# Patient Record
Sex: FEMALE | Race: OTHER | HISPANIC OR LATINO | URBAN - METROPOLITAN AREA
[De-identification: names, ages, dates, MRNs, and addresses within clinical notes are randomized per-mention and may not be internally consistent; named-entity substitution may affect disease eponyms.]

---

## 2019-05-21 ENCOUNTER — EMERGENCY (EMERGENCY)
Facility: HOSPITAL | Age: 54
LOS: 1 days | Discharge: HOME | End: 2019-05-21
Attending: EMERGENCY MEDICINE
Payer: COMMERCIAL

## 2019-05-21 PROCEDURE — 99285 EMERGENCY DEPT VISIT HI MDM: CPT | Mod: 25

## 2019-05-22 VITALS
HEART RATE: 72 BPM | WEIGHT: 179.9 LBS | RESPIRATION RATE: 16 BRPM | DIASTOLIC BLOOD PRESSURE: 87 MMHG | SYSTOLIC BLOOD PRESSURE: 147 MMHG | OXYGEN SATURATION: 99 % | TEMPERATURE: 98 F

## 2019-05-22 VITALS
SYSTOLIC BLOOD PRESSURE: 119 MMHG | OXYGEN SATURATION: 99 % | DIASTOLIC BLOOD PRESSURE: 66 MMHG | RESPIRATION RATE: 16 BRPM | HEART RATE: 63 BPM | TEMPERATURE: 98 F

## 2019-05-22 DIAGNOSIS — Z98.890 OTHER SPECIFIED POSTPROCEDURAL STATES: Chronic | ICD-10-CM

## 2019-05-22 LAB
ALBUMIN SERPL ELPH-MCNC: 3.5 G/DL — SIGNIFICANT CHANGE UP (ref 3.5–5)
ALP SERPL-CCNC: 62 U/L — SIGNIFICANT CHANGE UP (ref 40–120)
ALT FLD-CCNC: 25 U/L DA — SIGNIFICANT CHANGE UP (ref 10–60)
ANION GAP SERPL CALC-SCNC: 9 MMOL/L — SIGNIFICANT CHANGE UP (ref 5–17)
APTT BLD: 29.1 SEC — SIGNIFICANT CHANGE UP (ref 27.5–36.3)
AST SERPL-CCNC: 15 U/L — SIGNIFICANT CHANGE UP (ref 10–40)
BILIRUB SERPL-MCNC: 0.3 MG/DL — SIGNIFICANT CHANGE UP (ref 0.2–1.2)
BUN SERPL-MCNC: 14 MG/DL — SIGNIFICANT CHANGE UP (ref 7–18)
CALCIUM SERPL-MCNC: 8.7 MG/DL — SIGNIFICANT CHANGE UP (ref 8.4–10.5)
CHLORIDE SERPL-SCNC: 108 MMOL/L — SIGNIFICANT CHANGE UP (ref 96–108)
CO2 SERPL-SCNC: 25 MMOL/L — SIGNIFICANT CHANGE UP (ref 22–31)
CREAT SERPL-MCNC: 0.76 MG/DL — SIGNIFICANT CHANGE UP (ref 0.5–1.3)
D DIMER BLD IA.RAPID-MCNC: 218 NG/ML DDU — SIGNIFICANT CHANGE UP
GLUCOSE SERPL-MCNC: 104 MG/DL — HIGH (ref 70–99)
HCG SERPL-ACNC: <1 MIU/ML — SIGNIFICANT CHANGE UP
HCT VFR BLD CALC: 36.6 % — SIGNIFICANT CHANGE UP (ref 34.5–45)
HGB BLD-MCNC: 11.9 G/DL — SIGNIFICANT CHANGE UP (ref 11.5–15.5)
INR BLD: 0.93 RATIO — SIGNIFICANT CHANGE UP (ref 0.88–1.16)
MCHC RBC-ENTMCNC: 26 PG — LOW (ref 27–34)
MCHC RBC-ENTMCNC: 32.5 GM/DL — SIGNIFICANT CHANGE UP (ref 32–36)
MCV RBC AUTO: 80.1 FL — SIGNIFICANT CHANGE UP (ref 80–100)
NRBC # BLD: 0 /100 WBCS — SIGNIFICANT CHANGE UP (ref 0–0)
NT-PROBNP SERPL-SCNC: 37 PG/ML — SIGNIFICANT CHANGE UP (ref 0–125)
PLATELET # BLD AUTO: 306 K/UL — SIGNIFICANT CHANGE UP (ref 150–400)
POTASSIUM SERPL-MCNC: 3.8 MMOL/L — SIGNIFICANT CHANGE UP (ref 3.5–5.3)
POTASSIUM SERPL-SCNC: 3.8 MMOL/L — SIGNIFICANT CHANGE UP (ref 3.5–5.3)
PROT SERPL-MCNC: 7.2 G/DL — SIGNIFICANT CHANGE UP (ref 6–8.3)
PROTHROM AB SERPL-ACNC: 10.3 SEC — SIGNIFICANT CHANGE UP (ref 10–12.9)
RBC # BLD: 4.57 M/UL — SIGNIFICANT CHANGE UP (ref 3.8–5.2)
RBC # FLD: 13.9 % — SIGNIFICANT CHANGE UP (ref 10.3–14.5)
SODIUM SERPL-SCNC: 142 MMOL/L — SIGNIFICANT CHANGE UP (ref 135–145)
TROPONIN I SERPL-MCNC: <0.015 NG/ML — SIGNIFICANT CHANGE UP (ref 0–0.04)
WBC # BLD: 8.81 K/UL — SIGNIFICANT CHANGE UP (ref 3.8–10.5)
WBC # FLD AUTO: 8.81 K/UL — SIGNIFICANT CHANGE UP (ref 3.8–10.5)

## 2019-05-22 PROCEDURE — 84702 CHORIONIC GONADOTROPIN TEST: CPT

## 2019-05-22 PROCEDURE — 71275 CT ANGIOGRAPHY CHEST: CPT

## 2019-05-22 PROCEDURE — 71046 X-RAY EXAM CHEST 2 VIEWS: CPT

## 2019-05-22 PROCEDURE — 83880 ASSAY OF NATRIURETIC PEPTIDE: CPT

## 2019-05-22 PROCEDURE — 99284 EMERGENCY DEPT VISIT MOD MDM: CPT | Mod: 25

## 2019-05-22 PROCEDURE — 36415 COLL VENOUS BLD VENIPUNCTURE: CPT

## 2019-05-22 PROCEDURE — 85027 COMPLETE CBC AUTOMATED: CPT

## 2019-05-22 PROCEDURE — 71275 CT ANGIOGRAPHY CHEST: CPT | Mod: 26

## 2019-05-22 PROCEDURE — 85379 FIBRIN DEGRADATION QUANT: CPT

## 2019-05-22 PROCEDURE — 85610 PROTHROMBIN TIME: CPT

## 2019-05-22 PROCEDURE — 93005 ELECTROCARDIOGRAM TRACING: CPT

## 2019-05-22 PROCEDURE — 71046 X-RAY EXAM CHEST 2 VIEWS: CPT | Mod: 26

## 2019-05-22 PROCEDURE — 84484 ASSAY OF TROPONIN QUANT: CPT

## 2019-05-22 PROCEDURE — 85730 THROMBOPLASTIN TIME PARTIAL: CPT

## 2019-05-22 PROCEDURE — 80053 COMPREHEN METABOLIC PANEL: CPT

## 2019-05-22 NOTE — ED PROVIDER NOTE - CLINICAL SUMMARY MEDICAL DECISION MAKING FREE TEXT BOX
55 y/o F with a PMHx of breast CA presents to the ED with sudden onset chest pain and SOB that has since resolved x 2300 last night. Will check EKG, labs, CTA to r/o PE, and reassess 55 y/o F with a PMHx of breast CA presents to the ED with sudden onset chest pain and SOB that has since resolved x 2300 last night. Will check EKG, labs, CTA to r/o PE, and reassess    labs shows neg trop, ddimer wnl, CTA neg for PE  discussed above with patient, on reeval patient denies recurrence of chest pain/dyspnea while in ED. Pt stable for discharge.

## 2019-05-22 NOTE — ED PROVIDER NOTE - OBJECTIVE STATEMENT
53 y/o F with a significant PMHx of breast CA, on PO chemotherapy after lumpectomy, and no significant PSHX presents to the ED with c/o sudden onset chest pain and SOB x 2300 last night that has since resolved. Pt states she woke up with L-sided chest pain. Pt notes that after drinking lemon water the sxs resolved. Pt reports she has chronic LLE swelling and had a Doppler US earlier yesterday, but has not received the results. Pt endorses the swelling began in the fall of 2018 s/p starting Tamoxifen. Pt denies fever, cough, or any other complaints. NKDA.

## 2020-12-15 ENCOUNTER — INPATIENT (INPATIENT)
Facility: HOSPITAL | Age: 55
LOS: 4 days | Discharge: HOME | DRG: 177 | End: 2020-12-20
Attending: INTERNAL MEDICINE | Admitting: INTERNAL MEDICINE
Payer: COMMERCIAL

## 2020-12-15 VITALS
RESPIRATION RATE: 22 BRPM | SYSTOLIC BLOOD PRESSURE: 133 MMHG | HEIGHT: 66.93 IN | HEART RATE: 87 BPM | OXYGEN SATURATION: 92 % | WEIGHT: 176.37 LBS | TEMPERATURE: 99 F | DIASTOLIC BLOOD PRESSURE: 82 MMHG

## 2020-12-15 DIAGNOSIS — Z90.11 ACQUIRED ABSENCE OF RIGHT BREAST AND NIPPLE: Chronic | ICD-10-CM

## 2020-12-15 DIAGNOSIS — C50.919 MALIGNANT NEOPLASM OF UNSPECIFIED SITE OF UNSPECIFIED FEMALE BREAST: ICD-10-CM

## 2020-12-15 DIAGNOSIS — Z90.49 ACQUIRED ABSENCE OF OTHER SPECIFIED PARTS OF DIGESTIVE TRACT: Chronic | ICD-10-CM

## 2020-12-15 DIAGNOSIS — U07.1 COVID-19: ICD-10-CM

## 2020-12-15 DIAGNOSIS — Z29.9 ENCOUNTER FOR PROPHYLACTIC MEASURES, UNSPECIFIED: ICD-10-CM

## 2020-12-15 DIAGNOSIS — Z98.890 OTHER SPECIFIED POSTPROCEDURAL STATES: Chronic | ICD-10-CM

## 2020-12-15 LAB
ALBUMIN SERPL ELPH-MCNC: 3 G/DL — LOW (ref 3.5–5)
ALP SERPL-CCNC: 98 U/L — SIGNIFICANT CHANGE UP (ref 40–120)
ALT FLD-CCNC: 68 U/L DA — HIGH (ref 10–60)
ANION GAP SERPL CALC-SCNC: 7 MMOL/L — SIGNIFICANT CHANGE UP (ref 5–17)
AST SERPL-CCNC: 55 U/L — HIGH (ref 10–40)
BASOPHILS # BLD AUTO: 0 K/UL — SIGNIFICANT CHANGE UP (ref 0–0.2)
BASOPHILS NFR BLD AUTO: 0 % — SIGNIFICANT CHANGE UP (ref 0–2)
BILIRUB SERPL-MCNC: 0.3 MG/DL — SIGNIFICANT CHANGE UP (ref 0.2–1.2)
BUN SERPL-MCNC: 7 MG/DL — SIGNIFICANT CHANGE UP (ref 7–18)
CALCIUM SERPL-MCNC: 8.6 MG/DL — SIGNIFICANT CHANGE UP (ref 8.4–10.5)
CHLORIDE SERPL-SCNC: 100 MMOL/L — SIGNIFICANT CHANGE UP (ref 96–108)
CO2 SERPL-SCNC: 28 MMOL/L — SIGNIFICANT CHANGE UP (ref 22–31)
CREAT SERPL-MCNC: 0.57 MG/DL — SIGNIFICANT CHANGE UP (ref 0.5–1.3)
CRP SERPL-MCNC: 4.51 MG/DL — HIGH (ref 0–0.4)
D DIMER BLD IA.RAPID-MCNC: 211 NG/ML DDU — SIGNIFICANT CHANGE UP
EOSINOPHIL # BLD AUTO: 0 K/UL — SIGNIFICANT CHANGE UP (ref 0–0.5)
EOSINOPHIL NFR BLD AUTO: 0 % — SIGNIFICANT CHANGE UP (ref 0–6)
FERRITIN SERPL-MCNC: 687 NG/ML — HIGH (ref 15–150)
GLUCOSE SERPL-MCNC: 95 MG/DL — SIGNIFICANT CHANGE UP (ref 70–99)
HCG SERPL-ACNC: <1 MIU/ML — SIGNIFICANT CHANGE UP
HCG UR QL: NEGATIVE — SIGNIFICANT CHANGE UP
HCT VFR BLD CALC: 37.3 % — SIGNIFICANT CHANGE UP (ref 34.5–45)
HGB BLD-MCNC: 11.8 G/DL — SIGNIFICANT CHANGE UP (ref 11.5–15.5)
LYMPHOCYTES # BLD AUTO: 0.77 K/UL — LOW (ref 1–3.3)
LYMPHOCYTES # BLD AUTO: 15 % — SIGNIFICANT CHANGE UP (ref 13–44)
MANUAL SMEAR VERIFICATION: SIGNIFICANT CHANGE UP
MCHC RBC-ENTMCNC: 25.3 PG — LOW (ref 27–34)
MCHC RBC-ENTMCNC: 31.6 GM/DL — LOW (ref 32–36)
MCV RBC AUTO: 80 FL — SIGNIFICANT CHANGE UP (ref 80–100)
MONOCYTES # BLD AUTO: 0.15 K/UL — SIGNIFICANT CHANGE UP (ref 0–0.9)
MONOCYTES NFR BLD AUTO: 3 % — SIGNIFICANT CHANGE UP (ref 2–14)
NEUTROPHILS # BLD AUTO: 4.12 K/UL — SIGNIFICANT CHANGE UP (ref 1.8–7.4)
NEUTROPHILS NFR BLD AUTO: 80 % — HIGH (ref 43–77)
NRBC # BLD: 0 /100 — SIGNIFICANT CHANGE UP (ref 0–0)
PLAT MORPH BLD: NORMAL — SIGNIFICANT CHANGE UP
PLATELET # BLD AUTO: 211 K/UL — SIGNIFICANT CHANGE UP (ref 150–400)
PLATELET COUNT - ESTIMATE: NORMAL — SIGNIFICANT CHANGE UP
POTASSIUM SERPL-MCNC: 4 MMOL/L — SIGNIFICANT CHANGE UP (ref 3.5–5.3)
POTASSIUM SERPL-SCNC: 4 MMOL/L — SIGNIFICANT CHANGE UP (ref 3.5–5.3)
PROCALCITONIN SERPL-MCNC: 0.07 NG/ML — SIGNIFICANT CHANGE UP (ref 0.02–0.1)
PROT SERPL-MCNC: 7.9 G/DL — SIGNIFICANT CHANGE UP (ref 6–8.3)
RAPID RVP RESULT: DETECTED
RBC # BLD: 4.66 M/UL — SIGNIFICANT CHANGE UP (ref 3.8–5.2)
RBC # FLD: 13.4 % — SIGNIFICANT CHANGE UP (ref 10.3–14.5)
RBC BLD AUTO: NORMAL — SIGNIFICANT CHANGE UP
SARS-COV-2 RNA SPEC QL NAA+PROBE: DETECTED
SODIUM SERPL-SCNC: 135 MMOL/L — SIGNIFICANT CHANGE UP (ref 135–145)
TROPONIN I SERPL-MCNC: <0.015 NG/ML — SIGNIFICANT CHANGE UP (ref 0–0.04)
VARIANT LYMPHS # BLD: 2 % — SIGNIFICANT CHANGE UP (ref 0–6)
WBC # BLD: 5.15 K/UL — SIGNIFICANT CHANGE UP (ref 3.8–10.5)
WBC # FLD AUTO: 5.15 K/UL — SIGNIFICANT CHANGE UP (ref 3.8–10.5)

## 2020-12-15 PROCEDURE — 71045 X-RAY EXAM CHEST 1 VIEW: CPT | Mod: 26

## 2020-12-15 PROCEDURE — 99285 EMERGENCY DEPT VISIT HI MDM: CPT

## 2020-12-15 RX ORDER — ASCORBIC ACID 60 MG
500 TABLET,CHEWABLE ORAL DAILY
Refills: 0 | Status: DISCONTINUED | OUTPATIENT
Start: 2020-12-15 | End: 2020-12-20

## 2020-12-15 RX ORDER — REMDESIVIR 5 MG/ML
INJECTION INTRAVENOUS
Refills: 0 | Status: COMPLETED | OUTPATIENT
Start: 2020-12-15 | End: 2020-12-19

## 2020-12-15 RX ORDER — DEXAMETHASONE 0.5 MG/5ML
6 ELIXIR ORAL ONCE
Refills: 0 | Status: COMPLETED | OUTPATIENT
Start: 2020-12-15 | End: 2020-12-15

## 2020-12-15 RX ORDER — ENOXAPARIN SODIUM 100 MG/ML
40 INJECTION SUBCUTANEOUS DAILY
Refills: 0 | Status: DISCONTINUED | OUTPATIENT
Start: 2020-12-15 | End: 2020-12-20

## 2020-12-15 RX ORDER — CHOLECALCIFEROL (VITAMIN D3) 125 MCG
2000 CAPSULE ORAL DAILY
Refills: 0 | Status: DISCONTINUED | OUTPATIENT
Start: 2020-12-15 | End: 2020-12-20

## 2020-12-15 RX ORDER — REMDESIVIR 5 MG/ML
100 INJECTION INTRAVENOUS EVERY 24 HOURS
Refills: 0 | Status: COMPLETED | OUTPATIENT
Start: 2020-12-16 | End: 2020-12-19

## 2020-12-15 RX ORDER — ZINC SULFATE TAB 220 MG (50 MG ZINC EQUIVALENT) 220 (50 ZN) MG
220 TAB ORAL DAILY
Refills: 0 | Status: DISCONTINUED | OUTPATIENT
Start: 2020-12-15 | End: 2020-12-20

## 2020-12-15 RX ORDER — DEXAMETHASONE 0.5 MG/5ML
6 ELIXIR ORAL DAILY
Refills: 0 | Status: DISCONTINUED | OUTPATIENT
Start: 2020-12-16 | End: 2020-12-20

## 2020-12-15 RX ORDER — ALBUTEROL 90 UG/1
4 AEROSOL, METERED ORAL ONCE
Refills: 0 | Status: COMPLETED | OUTPATIENT
Start: 2020-12-15 | End: 2020-12-15

## 2020-12-15 RX ORDER — REMDESIVIR 5 MG/ML
200 INJECTION INTRAVENOUS EVERY 24 HOURS
Refills: 0 | Status: COMPLETED | OUTPATIENT
Start: 2020-12-15 | End: 2020-12-15

## 2020-12-15 RX ADMIN — ALBUTEROL 4 PUFF(S): 90 AEROSOL, METERED ORAL at 13:24

## 2020-12-15 RX ADMIN — Medication 100 MILLIGRAM(S): at 23:30

## 2020-12-15 RX ADMIN — REMDESIVIR 200 MILLIGRAM(S): 5 INJECTION INTRAVENOUS at 20:32

## 2020-12-15 RX ADMIN — Medication 6 MILLIGRAM(S): at 13:24

## 2020-12-15 RX ADMIN — Medication 100 MILLIGRAM(S): at 17:49

## 2020-12-15 NOTE — ED PROVIDER NOTE - CARE PLAN
Principal Discharge DX:	COVID-19 virus infection  Secondary Diagnosis:	Hypoxemia requiring supplemental oxygen

## 2020-12-15 NOTE — H&P ADULT - NSHPPHYSICALEXAM_GEN_ALL_CORE
PHYSICAL EXAM:  GENERAL: NAD, on 4L NC  HEAD:  Atraumatic, Normocephalic  EYES: EOMI, PERRLA, conjunctiva and sclera clear  ENT: Moist mucous membranes  NECK: Supple, No JVD  CHEST/LUNG: Clear to auscultation bilaterally; No rales, rhonchi, wheezing, or rubs. cough occasionally   HEART: Regular rate and rhythm; No murmurs, rubs, or gallops  ABDOMEN: Bowel sounds present; Soft, Nontender, Nondistended.   EXTREMITIES:  2+ Peripheral Pulses, brisk capillary refill. No clubbing, cyanosis, or edema  NERVOUS SYSTEM:  Alert & Oriented X3, speech clear. No deficits   MSK: FROM all 4 extremities, full and equal strength  SKIN: No rashes or lesions

## 2020-12-15 NOTE — ED PROVIDER NOTE - CLINICAL SUMMARY MEDICAL DECISION MAKING FREE TEXT BOX
55F with chest tightness and shortness of breath. Pt O2SAT 92% at triage. Give ventilate inhaler, perform labs, CXR, ddimer and reassess.

## 2020-12-15 NOTE — H&P ADULT - PROBLEM SELECTOR PLAN 2
pt has h/o cough, fevers, myalgias and now SOB  low grade fever in ED  CXR shows bilateral patchy opacities  COVID + outpatient   s/p decadron in ED  c/w dexamethasone 6mg x 9 days  consider remdesevir  will continue with vitamin D, zinc, vitamin C  prn robitussin for cough   f/u COVID markers ESR, CRP, LDH, D dimers, troponin pt has h/o cough, fevers, myalgias and now SOB  low grade fever in ED  CXR shows bilateral patchy opacities  COVID + outpatient   s/p decadron in ED  c/w dexamethasone 6mg x 9 days  start on remdesevir after ID approval  will continue with vitamin D, zinc, vitamin C  prn robitussin for cough   f/u COVID markers ESR, CRP, LDH, D dimers, troponin

## 2020-12-15 NOTE — H&P ADULT - NSICDXPASTSURGICALHX_GEN_ALL_CORE_FT
PAST SURGICAL HISTORY:  H/O right mastectomy     History of colon resection     S/P appendectomy     S/P cholecystectomy

## 2020-12-15 NOTE — ED ADULT NURSE NOTE - OBJECTIVE STATEMENT
C/o chest pain with sob and cough.  Positive Covid recently.  Oxygen saturation improved with supplemental oxygen.  placed on cardiac monitor, ekg done, placed in isolation room.

## 2020-12-15 NOTE — H&P ADULT - HISTORY OF PRESENT ILLNESS
Patient, a 55 year old female from home, with PMH of right sided lobular CA in situ breast s/p mastectomy presents to the ED c/o shortness of breath today. Patient reports fever, cough and myalgias x 1 week, new onset of shortness of breath and chest tightness x today. Pt notes last fever x 2 days since resolved. Pt also notes dyspnea on exertion and decreased appetite. Pt relays sick contact () who is COVID positive, pt tested COVID positive x 10 days. Pt denies chills, leg pain/swelling, or any other complaints. No Hx blood clots or pulmonary disease.  Patient, a 55 year old female from home, with PMH of right sided lobular CA in situ breast s/p mastectomy presents to the ED c/o shortness of breath today. Patient reports fever, cough and myalgias x 1.5 week. She was tested positive on 8th dec and has been having symptoms since the 4th December. Patient's  was also COVID + after travelling recently. Patient reports having shortness of breath and chest tightness today. Saturation at home was 88% on RA. Last fever was 2 days ago. She also complains of diarrhea and decreased appetite. Patient denies chills, leg pain/swelling, or any other complaints.    In ED, saturating 97% on 4L NC. She received decadron and albuterol in the ED.

## 2020-12-15 NOTE — ED ADULT NURSE NOTE - NSIMPLEMENTINTERV_GEN_ALL_ED
Implemented All Universal Safety Interventions:  [place] to call system. Call bell, personal items and telephone within reach. Instruct patient to call for assistance. Room bathroom lighting operational. Non-slip footwear when patient is off stretcher. Physically safe environment: no spills, clutter or unnecessary equipment. Stretcher in lowest position, wheels locked, appropriate side rails in place.

## 2020-12-15 NOTE — H&P ADULT - ATTENDING COMMENTS
Seen and examined . 55 years female with H/O Breast ca present with Hypoxia and COVID positive. Started on Remdisvir , Dexamethsone ,Oxygen and supportive care. Will monitor her oxygen saturation very closely as well LFT and Inflammatory markers. .

## 2020-12-15 NOTE — H&P ADULT - PROBLEM SELECTOR PLAN 1
pt presents with dyspnea and cough  was saturating at 88% on RA at home, 92% in ED on RA  currently 97% on 4L   c/w oxygen supplementation and monitor sats

## 2020-12-15 NOTE — ED PROVIDER NOTE - OBJECTIVE STATEMENT
56 y/o female with PMHx of breast ca s/p mastectomy presents to the ED c/o shortness of breath x today. Pt notes onset of worsening cough and myalgia x 1 week, new onset of shortness of breath and chest tightness x today. Pt notes last fever x 2 days since resolved. Pt also notes dyspnea on exertion and decreased appetite. Pt relays sick contact () who is COVID positive, pt tested COVID positive x 10 days. Pt denies chills, leg pain/swelling, or any other complaints. No Hx blood clots or pulmonary disease. NKDA.

## 2020-12-15 NOTE — H&P ADULT - PROBLEM SELECTOR PLAN 3
patient s/p right mastectomy  was on tamoxifen but recently stopped by her oncologist  f/u with oncologist outpatient patient s/p right mastectomy  was on tamoxifen but recently stopped by her oncologist  f/u with oncologist outpatient  primary team to confirm with oncologist regarding continuing to hold or continue tamoxifen

## 2020-12-15 NOTE — H&P ADULT - PROBLEM SELECTOR PLAN 4
[ ] Previous VTE                                    3  [ ] Thrombophilia                                  2  [ ] Lower limb paralysis                        2  (unable to hold up >15 seconds)    [ ] Current Cancer (within 6 months)        2   [ ] Immobilization > 24 hrs                    1  [ ] ICU/CCU stay > 24 hrs                      1  [ ] Age > 60                                         1   lovenox since pt is covid and has high risk of VTE

## 2020-12-15 NOTE — H&P ADULT - ASSESSMENT
Patient, a 55 year old female from home, with PMH of right sided lobular CA in situ breast s/p mastectomy presents to the ED c/o shortness of breath today and fever, cough for a week. COVID known positive. CXR shows infiltrates bilaterally.     Admitted for acute hypoxemic respiratory failure 2/2 COVID.

## 2020-12-16 LAB
A1C WITH ESTIMATED AVERAGE GLUCOSE RESULT: 5.6 % — SIGNIFICANT CHANGE UP (ref 4–5.6)
ALBUMIN SERPL ELPH-MCNC: 2.8 G/DL — LOW (ref 3.5–5)
ALP SERPL-CCNC: 98 U/L — SIGNIFICANT CHANGE UP (ref 40–120)
ALT FLD-CCNC: 81 U/L DA — HIGH (ref 10–60)
ANION GAP SERPL CALC-SCNC: 11 MMOL/L — SIGNIFICANT CHANGE UP (ref 5–17)
ANISOCYTOSIS BLD QL: SLIGHT — SIGNIFICANT CHANGE UP
AST SERPL-CCNC: 67 U/L — HIGH (ref 10–40)
BASOPHILS # BLD AUTO: 0 K/UL — SIGNIFICANT CHANGE UP (ref 0–0.2)
BASOPHILS NFR BLD AUTO: 0 % — SIGNIFICANT CHANGE UP (ref 0–2)
BILIRUB SERPL-MCNC: 0.4 MG/DL — SIGNIFICANT CHANGE UP (ref 0.2–1.2)
BUN SERPL-MCNC: 9 MG/DL — SIGNIFICANT CHANGE UP (ref 7–18)
CALCIUM SERPL-MCNC: 8.5 MG/DL — SIGNIFICANT CHANGE UP (ref 8.4–10.5)
CHLORIDE SERPL-SCNC: 100 MMOL/L — SIGNIFICANT CHANGE UP (ref 96–108)
CHOLEST SERPL-MCNC: 133 MG/DL — SIGNIFICANT CHANGE UP
CK SERPL-CCNC: 63 U/L — SIGNIFICANT CHANGE UP (ref 21–215)
CO2 SERPL-SCNC: 25 MMOL/L — SIGNIFICANT CHANGE UP (ref 22–31)
CREAT SERPL-MCNC: 0.6 MG/DL — SIGNIFICANT CHANGE UP (ref 0.5–1.3)
D DIMER BLD IA.RAPID-MCNC: 261 NG/ML DDU — HIGH
EOSINOPHIL # BLD AUTO: 0 K/UL — SIGNIFICANT CHANGE UP (ref 0–0.5)
EOSINOPHIL NFR BLD AUTO: 0 % — SIGNIFICANT CHANGE UP (ref 0–6)
ESTIMATED AVERAGE GLUCOSE: 114 MG/DL — SIGNIFICANT CHANGE UP (ref 68–114)
FOLATE SERPL-MCNC: >20 NG/ML — SIGNIFICANT CHANGE UP
GLUCOSE SERPL-MCNC: 106 MG/DL — HIGH (ref 70–99)
HCT VFR BLD CALC: 37.2 % — SIGNIFICANT CHANGE UP (ref 34.5–45)
HDLC SERPL-MCNC: 35 MG/DL — LOW
HGB BLD-MCNC: 11.9 G/DL — SIGNIFICANT CHANGE UP (ref 11.5–15.5)
LDH SERPL L TO P-CCNC: 347 U/L — HIGH (ref 120–225)
LIPID PNL WITH DIRECT LDL SERPL: 71 MG/DL — SIGNIFICANT CHANGE UP
LYMPHOCYTES # BLD AUTO: 0.6 K/UL — LOW (ref 1–3.3)
LYMPHOCYTES # BLD AUTO: 12 % — LOW (ref 13–44)
MAGNESIUM SERPL-MCNC: 2.3 MG/DL — SIGNIFICANT CHANGE UP (ref 1.6–2.6)
MANUAL SMEAR VERIFICATION: SIGNIFICANT CHANGE UP
MCHC RBC-ENTMCNC: 25.5 PG — LOW (ref 27–34)
MCHC RBC-ENTMCNC: 32 GM/DL — SIGNIFICANT CHANGE UP (ref 32–36)
MCV RBC AUTO: 79.7 FL — LOW (ref 80–100)
MONOCYTES # BLD AUTO: 0.15 K/UL — SIGNIFICANT CHANGE UP (ref 0–0.9)
MONOCYTES NFR BLD AUTO: 3 % — SIGNIFICANT CHANGE UP (ref 2–14)
NEUTROPHILS # BLD AUTO: 4.1 K/UL — SIGNIFICANT CHANGE UP (ref 1.8–7.4)
NEUTROPHILS NFR BLD AUTO: 82 % — HIGH (ref 43–77)
NON HDL CHOLESTEROL: 98 MG/DL — SIGNIFICANT CHANGE UP
NRBC # BLD: 0 /100 — SIGNIFICANT CHANGE UP (ref 0–0)
PHOSPHATE SERPL-MCNC: 3.5 MG/DL — SIGNIFICANT CHANGE UP (ref 2.5–4.5)
PLAT MORPH BLD: NORMAL — SIGNIFICANT CHANGE UP
PLATELET # BLD AUTO: 234 K/UL — SIGNIFICANT CHANGE UP (ref 150–400)
PLATELET COUNT - ESTIMATE: NORMAL — SIGNIFICANT CHANGE UP
POTASSIUM SERPL-MCNC: 4 MMOL/L — SIGNIFICANT CHANGE UP (ref 3.5–5.3)
POTASSIUM SERPL-SCNC: 4 MMOL/L — SIGNIFICANT CHANGE UP (ref 3.5–5.3)
PROT SERPL-MCNC: 7.6 G/DL — SIGNIFICANT CHANGE UP (ref 6–8.3)
RBC # BLD: 4.67 M/UL — SIGNIFICANT CHANGE UP (ref 3.8–5.2)
RBC # FLD: 13.8 % — SIGNIFICANT CHANGE UP (ref 10.3–14.5)
RBC BLD AUTO: ABNORMAL
SARS-COV-2 IGG SERPL QL IA: NEGATIVE — SIGNIFICANT CHANGE UP
SARS-COV-2 IGM SERPL IA-ACNC: <0.1 INDEX — SIGNIFICANT CHANGE UP
SODIUM SERPL-SCNC: 136 MMOL/L — SIGNIFICANT CHANGE UP (ref 135–145)
TRIGL SERPL-MCNC: 133 MG/DL — SIGNIFICANT CHANGE UP
TROPONIN I SERPL-MCNC: <0.015 NG/ML — SIGNIFICANT CHANGE UP (ref 0–0.04)
TSH SERPL-MCNC: 1.57 UU/ML — SIGNIFICANT CHANGE UP (ref 0.34–4.82)
VARIANT LYMPHS # BLD: 3 % — SIGNIFICANT CHANGE UP (ref 0–6)
VIT B12 SERPL-MCNC: 497 PG/ML — SIGNIFICANT CHANGE UP (ref 232–1245)
WBC # BLD: 5 K/UL — SIGNIFICANT CHANGE UP (ref 3.8–10.5)
WBC # FLD AUTO: 5 K/UL — SIGNIFICANT CHANGE UP (ref 3.8–10.5)

## 2020-12-16 RX ORDER — ACETAMINOPHEN 500 MG
650 TABLET ORAL ONCE
Refills: 0 | Status: COMPLETED | OUTPATIENT
Start: 2020-12-16 | End: 2020-12-16

## 2020-12-16 RX ADMIN — REMDESIVIR 500 MILLIGRAM(S): 5 INJECTION INTRAVENOUS at 18:22

## 2020-12-16 RX ADMIN — Medication 2000 UNIT(S): at 12:00

## 2020-12-16 RX ADMIN — Medication 500 MILLIGRAM(S): at 12:00

## 2020-12-16 RX ADMIN — Medication 6 MILLIGRAM(S): at 05:48

## 2020-12-16 RX ADMIN — Medication 650 MILLIGRAM(S): at 02:40

## 2020-12-16 RX ADMIN — Medication 650 MILLIGRAM(S): at 02:10

## 2020-12-16 RX ADMIN — Medication 100 MILLIGRAM(S): at 23:42

## 2020-12-16 RX ADMIN — ZINC SULFATE TAB 220 MG (50 MG ZINC EQUIVALENT) 220 MILLIGRAM(S): 220 (50 ZN) TAB at 11:59

## 2020-12-16 RX ADMIN — ENOXAPARIN SODIUM 40 MILLIGRAM(S): 100 INJECTION SUBCUTANEOUS at 12:00

## 2020-12-16 NOTE — PROGRESS NOTE ADULT - SUBJECTIVE AND OBJECTIVE BOX
INTERVAL HPI/OVERNIGHT EVENTS: I feel much better today.   Vital Signs Last 24 Hrs  T(C): 36.8 (16 Dec 2020 15:06), Max: 37.7 (15 Dec 2020 19:07)  T(F): 98.2 (16 Dec 2020 15:06), Max: 99.8 (15 Dec 2020 19:07)  HR: 77 (16 Dec 2020 15:06) (67 - 85)  BP: 113/73 (16 Dec 2020 15:06) (106/59 - 128/64)  BP(mean): --  RR: 18 (16 Dec 2020 15:06) (18 - 19)  SpO2: 94% (16 Dec 2020 15:06) (94% - 97%)  I&O's Summary    15 Dec 2020 07:01  -  16 Dec 2020 07:00  --------------------------------------------------------  IN: 75 mL / OUT: 300 mL / NET: -225 mL    16 Dec 2020 07:01  -  16 Dec 2020 16:23  --------------------------------------------------------  IN: 430 mL / OUT: 0 mL / NET: 430 mL      MEDICATIONS  (STANDING):  ascorbic acid 500 milliGRAM(s) Oral daily  cholecalciferol 2000 Unit(s) Oral daily  dexAMETHasone  Injectable 6 milliGRAM(s) IV Push daily  enoxaparin Injectable 40 milliGRAM(s) SubCutaneous daily  remdesivir  IVPB   IV Intermittent   remdesivir  IVPB 100 milliGRAM(s) IV Intermittent every 24 hours  zinc sulfate 220 milliGRAM(s) Oral daily    MEDICATIONS  (PRN):  guaiFENesin   Syrup  (Sugar-Free) 100 milliGRAM(s) Oral every 6 hours PRN Cough    LABS:                        11.9   5.00  )-----------( 234      ( 16 Dec 2020 08:02 )             37.2     12-16    136  |  100  |  9   ----------------------------<  106<H>  4.0   |  25  |  0.60    Ca    8.5      16 Dec 2020 08:02  Phos  3.5     12-16  Mg     2.3     12-16    TPro  7.6  /  Alb  2.8<L>  /  TBili  0.4  /  DBili  x   /  AST  67<H>  /  ALT  81<H>  /  AlkPhos  98  12-16        CAPILLARY BLOOD GLUCOSE              REVIEW OF SYSTEMS:  CONSTITUTIONAL: No fever, weight loss, or fatigue  EYES: No eye pain, visual disturbances, or discharge  ENMT:  No difficulty hearing, tinnitus, vertigo; No sinus or throat pain  NECK: No pain or stiffness  RESPIRATORY: No cough, wheezing, chills or hemoptysis; lessshortness of breath  CARDIOVASCULAR: No chest pain, palpitations, dizziness, or leg swelling  GASTROINTESTINAL: No abdominal or epigastric pain. No nausea, vomiting, or hematemesis; No diarrhea or constipation. No melena or hematochezia.  GENITOURINARY: No dysuria, frequency, hematuria, or incontinence  NEUROLOGICAL: No headaches, memory loss, loss of strength, numbness, or tremors      Consultant(s) Notes Reviewed:  [x ] YES  [ ] NO    PHYSICAL EXAM:  GENERAL: NAD, NC Oxygen   HEAD:  Atraumatic, Normocephalic  EYES: EOMI, PERRLA, conjunctiva and sclera clear  ENMT: No tonsillar erythema, exudates, or enlargement; Moist mucous membranes, Good dentition, No lesions  NECK: Supple, No JVD, Normal thyroid  NERVOUS SYSTEM:  Alert & Oriented X3, No focal deficit   CHEST/LUNG: Good air entry bilateral with few  rales,   HEART: Regular rate and rhythm; No murmurs, rubs, or gallops  ABDOMEN: Soft, Nontender, Nondistended; Bowel sounds present  EXTREMITIES:  2+ Peripheral Pulses, No clubbing, cyanosis, or edema    Care Discussed with Consultants/Other Providers [ x] YES  [ ] NO

## 2020-12-16 NOTE — ED ADULT NURSE REASSESSMENT NOTE - NS ED NURSE REASSESS COMMENT FT1
19.30 pm . iv discontinued in right arm since pt had breast surgery done in right arm .pink brecelet applied for future  not using that arm.iv  started  in left arm with #22 angiocath good blood return ,saline flush done

## 2020-12-16 NOTE — PROGRESS NOTE ADULT - SUBJECTIVE AND OBJECTIVE BOX
PGY-1 Progress Note discussed with attending    PAGER #: [[phone]] TILL 5:00 PM  PLEASE CONTACT ON CALL TEAM:  - On Call Team (Please refer to Faiza) FROM 5:00 PM - 8:30PM  - Nightfloat Team FROM 8:30 -7:30 AM    CHIEF COMPLAINT & BRIEF HOSPITAL COURSE: 55F from home [facility] 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 Placed on 4L NC in ED with SpO2 97%, given x1 dose albuterol and started on decadron. Admitted to tele for further management of COVID pna.     INTERVAL HPI/OVERNIGHT EVENTS: NAEON. Tolerating 3L NC with SpO2 97%, reports feeling better, but endorses ongoing cough and diarrhea. Will trial 2L NC today. Started on remdesivir 12/15. Continuing on decadron from 12/15.     MEDICATIONS  (STANDING):  ascorbic acid 500 milliGRAM(s) Oral daily  cholecalciferol 2000 Unit(s) Oral daily  dexAMETHasone  Injectable 6 milliGRAM(s) IV Push daily  enoxaparin Injectable 40 milliGRAM(s) SubCutaneous daily  remdesivir  IVPB   IV Intermittent   remdesivir  IVPB 100 milliGRAM(s) IV Intermittent every 24 hours  zinc sulfate 220 milliGRAM(s) Oral daily    MEDICATIONS  (PRN):  guaiFENesin   Syrup  (Sugar-Free) 100 milliGRAM(s) Oral every 6 hours PRN Cough        REVIEW OF SYSTEMS:  CONSTITUTIONAL: No fever, weight loss, or fatigue  RESPIRATORY: No cough, wheezing, chills or hemoptysis; No shortness of breath  CARDIOVASCULAR: No chest pain, palpitations, dizziness, or leg swelling  GASTROINTESTINAL: No abdominal pain. No nausea, vomiting, or hematemesis; No diarrhea or constipation. No melena or hematochezia.  GENITOURINARY: No dysuria or hematuria, urinary frequency  NEUROLOGICAL: No headaches, memory loss, loss of strength, numbness, or tremors  SKIN: No itching, burning, rashes, or lesions     Vital Signs Last 24 Hrs  T(C): 37 (16 Dec 2020 11:15), Max: 38.1 (15 Dec 2020 14:28)  T(F): 98.6 (16 Dec 2020 11:15), Max: 100.5 (15 Dec 2020 14:28)  HR: 67 (16 Dec 2020 11:15) (67 - 88)  BP: 117/71 (16 Dec 2020 11:15) (106/59 - 144/76)  BP(mean): --  RR: 18 (16 Dec 2020 11:15) (18 - 22)  SpO2: 97% (16 Dec 2020 11:15) (92% - 98%)    PHYSICAL EXAMINATION:  GENERAL: NAD, well built  HEAD:  Atraumatic, Normocephalic  EYES:  conjunctiva and sclera clear  NECK: Supple, No JVD, Normal thyroid  CHEST/LUNG: Clear to auscultation. Clear to percussion bilaterally; No rales, rhonchi, wheezing, or rubs  HEART: Regular rate and rhythm; No murmurs, rubs, or gallops  ABDOMEN: Soft, Nontender, Nondistended; Bowel sounds present  NERVOUS SYSTEM: alert and oriented x3  EXTREMITIES:  2+ Peripheral Pulses, No clubbing, cyanosis, or edema  SKIN: warm dry                          11.9   5.00  )-----------( 234      ( 16 Dec 2020 08:02 )             37.2     12-16    136  |  100  |  9   ----------------------------<  106<H>  4.0   |  25  |  0.60    Ca    8.5      16 Dec 2020 08:02  Phos  3.5     12-16  Mg     2.3     12-16    TPro  7.6  /  Alb  2.8<L>  /  TBili  0.4  /  DBili  x   /  AST  67<H>  /  ALT  81<H>  /  AlkPhos  98  12-16    LIVER FUNCTIONS - ( 16 Dec 2020 08:02 )  Alb: 2.8 g/dL / Pro: 7.6 g/dL / ALK PHOS: 98 U/L / ALT: 81 U/L DA / AST: 67 U/L / GGT: x           CARDIAC MARKERS ( 16 Dec 2020 08:03 )  <0.015 ng/mL / x     / x     / x     / x      CARDIAC MARKERS ( 16 Dec 2020 08:02 )  x     / x     / 63 U/L / x     / x      CARDIAC MARKERS ( 15 Dec 2020 13:34 )  <0.015 ng/mL / x     / x     / x     / x              CAPILLARY BLOOD GLUCOSE      RADIOLOGY & ADDITIONAL TESTS:                   PGY-1 Progress Note discussed with attending    PAGER #: [[phone]] TILL 5:00 PM  PLEASE CONTACT ON CALL TEAM:  - On Call Team (Please refer to Faiza) FROM 5:00 PM - 8:30PM  - Nightfloat Team FROM 8:30 -7:30 AM    CHIEF COMPLAINT & BRIEF HOSPITAL COURSE: 55F from home [facility] 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 Placed on 4L NC in ED with SpO2 97%, given x1 dose albuterol and started on decadron. Admitted to tele for further management of COVID pna.     INTERVAL HPI/OVERNIGHT EVENTS: NAEON. Tolerating 3L NC with SpO2 97%, reports feeling better, but endorses ongoing cough and diarrhea. Will trial 2L NC today. Started on remdesivir 12/15. Continuing on decadron from 12/15.     MEDICATIONS  (STANDING):  ascorbic acid 500 milliGRAM(s) Oral daily  cholecalciferol 2000 Unit(s) Oral daily  dexAMETHasone  Injectable 6 milliGRAM(s) IV Push daily  enoxaparin Injectable 40 milliGRAM(s) SubCutaneous daily  remdesivir  IVPB   IV Intermittent   remdesivir  IVPB 100 milliGRAM(s) IV Intermittent every 24 hours  zinc sulfate 220 milliGRAM(s) Oral daily    MEDICATIONS  (PRN):  guaiFENesin   Syrup  (Sugar-Free) 100 milliGRAM(s) Oral every 6 hours PRN Cough    REVIEW OF SYSTEMS:  CONSTITUTIONAL: No fever or fatigue  RESPIRATORY: cough, SOB  CARDIOVASCULAR: No chest pain  GASTROINTESTINAL: No abdominal pain. No nausea, vomiting; +diarrhea, no constipation.  GENITOURINARY: No dysuria or hematuria  NEUROLOGICAL: no HA  SKIN: No itching or rashes    Vital Signs Last 24 Hrs  T(C): 37 (16 Dec 2020 11:15), Max: 38.1 (15 Dec 2020 14:28)  T(F): 98.6 (16 Dec 2020 11:15), Max: 100.5 (15 Dec 2020 14:28)  HR: 67 (16 Dec 2020 11:15) (67 - 88)  BP: 117/71 (16 Dec 2020 11:15) (106/59 - 144/76)  BP(mean): --  RR: 18 (16 Dec 2020 11:15) (18 - 22)  SpO2: 97% (16 Dec 2020 11:15) (92% - 98%)    PHYSICAL EXAMINATION:  GENERAL: NAD, well built  HEAD: Atraumatic, Normocephalic  EYES: conjunctiva and sclera clear  NECK: Supple  CHEST/LUNG: no increased WOB  HEART: Regular rate and rhythm  ABDOMEN: Soft, Nontender, Nondistended; Bowel sounds present  NERVOUS SYSTEM: alert and oriented x3  EXTREMITIES:  2+ Peripheral Pulses, No edema  SKIN: warm dry                          11.9   5.00  )-----------( 234      ( 16 Dec 2020 08:02 )             37.2     12-16    136  |  100  |  9   ----------------------------<  106<H>  4.0   |  25  |  0.60    Ca    8.5      16 Dec 2020 08:02  Phos  3.5     12-16  Mg     2.3     12-16    TPro  7.6  /  Alb  2.8<L>  /  TBili  0.4  /  DBili  x   /  AST  67<H>  /  ALT  81<H>  /  AlkPhos  98  12-16    LIVER FUNCTIONS - ( 16 Dec 2020 08:02 )  Alb: 2.8 g/dL / Pro: 7.6 g/dL / ALK PHOS: 98 U/L / ALT: 81 U/L DA / AST: 67 U/L / GGT: x           CARDIAC MARKERS ( 16 Dec 2020 08:03 )  <0.015 ng/mL / x     / x     / x     / x      CARDIAC MARKERS ( 16 Dec 2020 08:02 )  x     / x     / 63 U/L / x     / x      CARDIAC MARKERS ( 15 Dec 2020 13:34 )  <0.015 ng/mL / x     / x     / x     / x              CAPILLARY BLOOD GLUCOSE      RADIOLOGY & ADDITIONAL TESTS:

## 2020-12-16 NOTE — ED ADULT NURSE REASSESSMENT NOTE - NS ED NURSE REASSESS COMMENT FT1
1.10 am dinner  given .pt took dinner and tolerated good . aaox4 ,ambulating .on nc 2lpm .no distress noted

## 2020-12-17 LAB
ALBUMIN SERPL ELPH-MCNC: 3.1 G/DL — LOW (ref 3.5–5)
ALP SERPL-CCNC: 97 U/L — SIGNIFICANT CHANGE UP (ref 40–120)
ALT FLD-CCNC: 97 U/L DA — HIGH (ref 10–60)
ANION GAP SERPL CALC-SCNC: 9 MMOL/L — SIGNIFICANT CHANGE UP (ref 5–17)
AST SERPL-CCNC: 60 U/L — HIGH (ref 10–40)
BILIRUB SERPL-MCNC: 0.4 MG/DL — SIGNIFICANT CHANGE UP (ref 0.2–1.2)
BUN SERPL-MCNC: 14 MG/DL — SIGNIFICANT CHANGE UP (ref 7–18)
CALCIUM SERPL-MCNC: 8.9 MG/DL — SIGNIFICANT CHANGE UP (ref 8.4–10.5)
CHLORIDE SERPL-SCNC: 102 MMOL/L — SIGNIFICANT CHANGE UP (ref 96–108)
CO2 SERPL-SCNC: 26 MMOL/L — SIGNIFICANT CHANGE UP (ref 22–31)
CREAT SERPL-MCNC: 0.63 MG/DL — SIGNIFICANT CHANGE UP (ref 0.5–1.3)
CRP SERPL-MCNC: 3.48 MG/DL — HIGH (ref 0–0.4)
D DIMER BLD IA.RAPID-MCNC: 288 NG/ML DDU — HIGH
ERYTHROCYTE [SEDIMENTATION RATE] IN BLOOD: 67 MM/HR — HIGH (ref 0–20)
FERRITIN SERPL-MCNC: 938 NG/ML — HIGH (ref 15–150)
GLUCOSE SERPL-MCNC: 103 MG/DL — HIGH (ref 70–99)
HCT VFR BLD CALC: 39.1 % — SIGNIFICANT CHANGE UP (ref 34.5–45)
HGB BLD-MCNC: 12.4 G/DL — SIGNIFICANT CHANGE UP (ref 11.5–15.5)
LDH SERPL L TO P-CCNC: 341 U/L — HIGH (ref 120–225)
MAGNESIUM SERPL-MCNC: 2.4 MG/DL — SIGNIFICANT CHANGE UP (ref 1.6–2.6)
MCHC RBC-ENTMCNC: 25.4 PG — LOW (ref 27–34)
MCHC RBC-ENTMCNC: 31.7 GM/DL — LOW (ref 32–36)
MCV RBC AUTO: 80.1 FL — SIGNIFICANT CHANGE UP (ref 80–100)
NRBC # BLD: 0 /100 WBCS — SIGNIFICANT CHANGE UP (ref 0–0)
PHOSPHATE SERPL-MCNC: 3.3 MG/DL — SIGNIFICANT CHANGE UP (ref 2.5–4.5)
PLATELET # BLD AUTO: 253 K/UL — SIGNIFICANT CHANGE UP (ref 150–400)
POTASSIUM SERPL-MCNC: 4.1 MMOL/L — SIGNIFICANT CHANGE UP (ref 3.5–5.3)
POTASSIUM SERPL-SCNC: 4.1 MMOL/L — SIGNIFICANT CHANGE UP (ref 3.5–5.3)
PROCALCITONIN SERPL-MCNC: 0.09 NG/ML — SIGNIFICANT CHANGE UP (ref 0.02–0.1)
PROT SERPL-MCNC: 8.1 G/DL — SIGNIFICANT CHANGE UP (ref 6–8.3)
RBC # BLD: 4.88 M/UL — SIGNIFICANT CHANGE UP (ref 3.8–5.2)
RBC # FLD: 13.7 % — SIGNIFICANT CHANGE UP (ref 10.3–14.5)
SODIUM SERPL-SCNC: 137 MMOL/L — SIGNIFICANT CHANGE UP (ref 135–145)
WBC # BLD: 4.6 K/UL — SIGNIFICANT CHANGE UP (ref 3.8–10.5)
WBC # FLD AUTO: 4.6 K/UL — SIGNIFICANT CHANGE UP (ref 3.8–10.5)

## 2020-12-17 RX ADMIN — Medication 500 MILLIGRAM(S): at 12:32

## 2020-12-17 RX ADMIN — ZINC SULFATE TAB 220 MG (50 MG ZINC EQUIVALENT) 220 MILLIGRAM(S): 220 (50 ZN) TAB at 12:32

## 2020-12-17 RX ADMIN — ENOXAPARIN SODIUM 40 MILLIGRAM(S): 100 INJECTION SUBCUTANEOUS at 12:32

## 2020-12-17 RX ADMIN — REMDESIVIR 500 MILLIGRAM(S): 5 INJECTION INTRAVENOUS at 21:29

## 2020-12-17 RX ADMIN — Medication 6 MILLIGRAM(S): at 05:34

## 2020-12-17 RX ADMIN — Medication 2000 UNIT(S): at 12:32

## 2020-12-17 NOTE — PROGRESS NOTE ADULT - SUBJECTIVE AND OBJECTIVE BOX
INTERVAL HPI/OVERNIGHT EVENTS: I feel better.   Vital Signs Last 24 Hrs  T(C): 36.4 (17 Dec 2020 15:06), Max: 37 (16 Dec 2020 19:16)  T(F): 97.6 (17 Dec 2020 15:06), Max: 98.6 (16 Dec 2020 19:16)  HR: 63 (17 Dec 2020 15:06) (60 - 72)  BP: 129/67 (17 Dec 2020 15:06) (106/65 - 129/67)  BP(mean): --  RR: 18 (17 Dec 2020 15:06) (18 - 20)  SpO2: 94% (17 Dec 2020 15:06) (89% - 99%)  I&O's Summary    16 Dec 2020 07:01  -  17 Dec 2020 07:00  --------------------------------------------------------  IN: 430 mL / OUT: 400 mL / NET: 30 mL      MEDICATIONS  (STANDING):  ascorbic acid 500 milliGRAM(s) Oral daily  cholecalciferol 2000 Unit(s) Oral daily  dexAMETHasone  Injectable 6 milliGRAM(s) IV Push daily  enoxaparin Injectable 40 milliGRAM(s) SubCutaneous daily  remdesivir  IVPB   IV Intermittent   remdesivir  IVPB 100 milliGRAM(s) IV Intermittent every 24 hours  zinc sulfate 220 milliGRAM(s) Oral daily    MEDICATIONS  (PRN):  guaiFENesin   Syrup  (Sugar-Free) 100 milliGRAM(s) Oral every 6 hours PRN Cough    LABS:                        12.4   4.60  )-----------( 253      ( 17 Dec 2020 08:28 )             39.1     12-17    137  |  102  |  14  ----------------------------<  103<H>  4.1   |  26  |  0.63    Ca    8.9      17 Dec 2020 08:28  Phos  3.3     12-17  Mg     2.4     12-17    TPro  8.1  /  Alb  3.1<L>  /  TBili  0.4  /  DBili  x   /  AST  60<H>  /  ALT  97<H>  /  AlkPhos  97  12-17        CAPILLARY BLOOD GLUCOSE              REVIEW OF SYSTEMS:  CONSTITUTIONAL: No fever, weight loss, or fatigue  EYES: No eye pain, visual disturbances, or discharge  ENMT:  No difficulty hearing, tinnitus, vertigo; No sinus or throat pain  NECK: No pain or stiffness  RESPIRATORY: No cough, wheezing, chills or hemoptysis; No shortness of breath  CARDIOVASCULAR: No chest pain, palpitations, dizziness, or leg swelling  GASTROINTESTINAL: No abdominal or epigastric pain. No nausea, vomiting, or hematemesis; No diarrhea or constipation. No melena or hematochezia.  GENITOURINARY: No dysuria, frequency, hematuria, or incontinence  NEUROLOGICAL: No headaches, memory loss, loss of strength, numbness, or tremors      Consultant(s) Notes Reviewed:  [x ] YES  [ ] NO    PHYSICAL EXAM:  GENERAL: NAD, NC Oxygen 2L   HEAD:  Atraumatic, Normocephalic  EYES: EOMI, PERRLA, conjunctiva and sclera clear  ENMT: No tonsillar erythema, exudates, or enlargement; Moist mucous membranes, Good dentition, No lesions  NECK: Supple, No JVD, Normal thyroid  NERVOUS SYSTEM:  Alert & Oriented X3, No focal deficit   CHEST/LUNG: Good air entry bilateral with few rales, rhonchi,  HEART: Regular rate and rhythm; No murmurs, rubs, or gallops  ABDOMEN: Soft, Nontender, Nondistended; Bowel sounds present  EXTREMITIES:  2+ Peripheral Pulses, No clubbing, cyanosis, or edema    Care Discussed with Consultants/Other Providers [ x] YES  [ ] NO

## 2020-12-17 NOTE — PROGRESS NOTE ADULT - SUBJECTIVE AND OBJECTIVE BOX
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 Placed on 4L NC in ED with SpO2 97%, given x1 dose albuterol and started on decadron. Admitted to tele for further management of COVID pna.     INTERVAL HPI/OVERNIGHT EVENTS: NAEON. 2L NC overnight with SpO2 95%, noted to be on 4L on exam this am with discomfort from high O2 flow. Decreased again to 2L, will monitor. Continuing with remdesivir through 12/19 and decadron through 12/24. D-dimer mildly increased to 288 this am.     MEDICATIONS  (STANDING):  ascorbic acid 500 milliGRAM(s) Oral daily  cholecalciferol 2000 Unit(s) Oral daily  dexAMETHasone  Injectable 6 milliGRAM(s) IV Push daily  enoxaparin Injectable 40 milliGRAM(s) SubCutaneous daily  remdesivir  IVPB   IV Intermittent   remdesivir  IVPB 100 milliGRAM(s) IV Intermittent every 24 hours  zinc sulfate 220 milliGRAM(s) Oral daily    MEDICATIONS  (PRN):  guaiFENesin   Syrup  (Sugar-Free) 100 milliGRAM(s) Oral every 6 hours PRN Cough    REVIEW OF SYSTEMS:  CONSTITUTIONAL: no fever or fatigue, uncomfortable with O2 flow  RESPIRATORY: mild cough, mild SOB  CARDIOVASCULAR: No chest pain or dizziness  GASTROINTESTINAL: No abdominal pain. No nausea, vomiting; resolved diarrhea, no constipation.  GENITOURINARY: No dysuria or hematuria  NEUROLOGICAL: No headaches  SKIN: No itching or rashes     Vital Signs Last 24 Hrs  T(C): 36.8 (17 Dec 2020 08:16), Max: 37 (16 Dec 2020 11:15)  T(F): 98.3 (17 Dec 2020 08:16), Max: 98.6 (16 Dec 2020 11:15)  HR: 60 (17 Dec 2020 08:16) (60 - 77)  BP: 107/67 (17 Dec 2020 08:16) (106/65 - 127/78)  BP(mean): --  RR: 18 (17 Dec 2020 08:16) (18 - 20)  SpO2: 93% (17 Dec 2020 08:16) (93% - 99%)    PHYSICAL EXAMINATION:  GENERAL: NAD, well built  HEAD:  Atraumatic, Normocephalic  EYES:  conjunctiva and sclera clear, eomi  NECK: Supple  CHEST/LUNG: no increased WOB, good air entry b/l  HEART: Regular rate and rhythm  ABDOMEN: Soft, Nontender, Nondistended; Bowel sounds present  NERVOUS SYSTEM: alert and oriented x3  EXTREMITIES:  2+ Peripheral Pulses, No edema  SKIN: warm dry                          12.4   4.60  )-----------( 253      ( 17 Dec 2020 08:28 )             39.1     12-17    137  |  102  |  x   ----------------------------<  x   4.1   |  x   |  x     Ca    8.5      16 Dec 2020 08:02  Phos  3.5     12-16  Mg     2.3     12-16    TPro  7.6  /  Alb  2.8<L>  /  TBili  0.4  /  DBili  x   /  AST  67<H>  /  ALT  81<H>  /  AlkPhos  98  12-16    LIVER FUNCTIONS - ( 16 Dec 2020 08:02 )  Alb: 2.8 g/dL / Pro: 7.6 g/dL / ALK PHOS: 98 U/L / ALT: 81 U/L DA / AST: 67 U/L / GGT: x           CARDIAC MARKERS ( 16 Dec 2020 08:03 )  <0.015 ng/mL / x     / x     / x     / x      CARDIAC MARKERS ( 16 Dec 2020 08:02 )  x     / x     / 63 U/L / x     / x      CARDIAC MARKERS ( 15 Dec 2020 13:34 )  <0.015 ng/mL / x     / x     / x     / x              CAPILLARY BLOOD GLUCOSE      RADIOLOGY & ADDITIONAL TESTS:

## 2020-12-18 LAB
ALBUMIN SERPL ELPH-MCNC: 2.8 G/DL — LOW (ref 3.5–5)
ALP SERPL-CCNC: 87 U/L — SIGNIFICANT CHANGE UP (ref 40–120)
ALT FLD-CCNC: 89 U/L DA — HIGH (ref 10–60)
ANION GAP SERPL CALC-SCNC: 9 MMOL/L — SIGNIFICANT CHANGE UP (ref 5–17)
AST SERPL-CCNC: 40 U/L — SIGNIFICANT CHANGE UP (ref 10–40)
BILIRUB SERPL-MCNC: 0.3 MG/DL — SIGNIFICANT CHANGE UP (ref 0.2–1.2)
BUN SERPL-MCNC: 14 MG/DL — SIGNIFICANT CHANGE UP (ref 7–18)
CALCIUM SERPL-MCNC: 8.6 MG/DL — SIGNIFICANT CHANGE UP (ref 8.4–10.5)
CHLORIDE SERPL-SCNC: 102 MMOL/L — SIGNIFICANT CHANGE UP (ref 96–108)
CO2 SERPL-SCNC: 25 MMOL/L — SIGNIFICANT CHANGE UP (ref 22–31)
CREAT SERPL-MCNC: 0.52 MG/DL — SIGNIFICANT CHANGE UP (ref 0.5–1.3)
GLUCOSE SERPL-MCNC: 96 MG/DL — SIGNIFICANT CHANGE UP (ref 70–99)
HCT VFR BLD CALC: 36.6 % — SIGNIFICANT CHANGE UP (ref 34.5–45)
HGB BLD-MCNC: 11.8 G/DL — SIGNIFICANT CHANGE UP (ref 11.5–15.5)
MAGNESIUM SERPL-MCNC: 2.2 MG/DL — SIGNIFICANT CHANGE UP (ref 1.6–2.6)
MCHC RBC-ENTMCNC: 25.7 PG — LOW (ref 27–34)
MCHC RBC-ENTMCNC: 32.2 GM/DL — SIGNIFICANT CHANGE UP (ref 32–36)
MCV RBC AUTO: 79.6 FL — LOW (ref 80–100)
NRBC # BLD: 0 /100 WBCS — SIGNIFICANT CHANGE UP (ref 0–0)
PHOSPHATE SERPL-MCNC: 3.7 MG/DL — SIGNIFICANT CHANGE UP (ref 2.5–4.5)
PLATELET # BLD AUTO: 353 K/UL — SIGNIFICANT CHANGE UP (ref 150–400)
POTASSIUM SERPL-MCNC: 3.9 MMOL/L — SIGNIFICANT CHANGE UP (ref 3.5–5.3)
POTASSIUM SERPL-SCNC: 3.9 MMOL/L — SIGNIFICANT CHANGE UP (ref 3.5–5.3)
PROT SERPL-MCNC: 7.2 G/DL — SIGNIFICANT CHANGE UP (ref 6–8.3)
RBC # BLD: 4.6 M/UL — SIGNIFICANT CHANGE UP (ref 3.8–5.2)
RBC # FLD: 13.4 % — SIGNIFICANT CHANGE UP (ref 10.3–14.5)
SODIUM SERPL-SCNC: 136 MMOL/L — SIGNIFICANT CHANGE UP (ref 135–145)
WBC # BLD: 5.46 K/UL — SIGNIFICANT CHANGE UP (ref 3.8–10.5)
WBC # FLD AUTO: 5.46 K/UL — SIGNIFICANT CHANGE UP (ref 3.8–10.5)

## 2020-12-18 RX ADMIN — ZINC SULFATE TAB 220 MG (50 MG ZINC EQUIVALENT) 220 MILLIGRAM(S): 220 (50 ZN) TAB at 11:26

## 2020-12-18 RX ADMIN — ENOXAPARIN SODIUM 40 MILLIGRAM(S): 100 INJECTION SUBCUTANEOUS at 11:26

## 2020-12-18 RX ADMIN — Medication 500 MILLIGRAM(S): at 11:26

## 2020-12-18 RX ADMIN — Medication 6 MILLIGRAM(S): at 05:23

## 2020-12-18 RX ADMIN — REMDESIVIR 500 MILLIGRAM(S): 5 INJECTION INTRAVENOUS at 17:30

## 2020-12-18 RX ADMIN — Medication 2000 UNIT(S): at 11:26

## 2020-12-18 NOTE — PROGRESS NOTE ADULT - SUBJECTIVE AND OBJECTIVE BOX
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 Placed on 4L NC in ED with SpO2 97%, given x1 dose albuterol and started on decadron. Admitted to tele for further management of COVID pna.     INTERVAL HPI/OVERNIGHT EVENTS: NAEON. Desat to 89% on 2L NC yesterday, 96% on 2L NC this am.     MEDICATIONS  (STANDING):  ascorbic acid 500 milliGRAM(s) Oral daily  cholecalciferol 2000 Unit(s) Oral daily  dexAMETHasone  Injectable 6 milliGRAM(s) IV Push daily  enoxaparin Injectable 40 milliGRAM(s) SubCutaneous daily  remdesivir  IVPB   IV Intermittent   remdesivir  IVPB 100 milliGRAM(s) IV Intermittent every 24 hours  zinc sulfate 220 milliGRAM(s) Oral daily    MEDICATIONS  (PRN):  guaiFENesin   Syrup  (Sugar-Free) 100 milliGRAM(s) Oral every 6 hours PRN Cough        REVIEW OF SYSTEMS:  CONSTITUTIONAL: No fever, weight loss, or fatigue  RESPIRATORY: No cough, wheezing, chills or hemoptysis; No shortness of breath  CARDIOVASCULAR: No chest pain, palpitations, dizziness, or leg swelling  GASTROINTESTINAL: No abdominal pain. No nausea, vomiting, or hematemesis; No diarrhea or constipation. No melena or hematochezia.  GENITOURINARY: No dysuria or hematuria, urinary frequency  NEUROLOGICAL: No headaches, memory loss, loss of strength, numbness, or tremors  SKIN: No itching, burning, rashes, or lesions     Vital Signs Last 24 Hrs  T(C): 36.6 (18 Dec 2020 11:15), Max: 36.7 (17 Dec 2020 19:20)  T(F): 97.9 (18 Dec 2020 11:15), Max: 98.1 (17 Dec 2020 19:20)  HR: 68 (18 Dec 2020 11:15) (56 - 82)  BP: 112/69 (18 Dec 2020 11:15) (101/57 - 129/67)  BP(mean): --  RR: 18 (18 Dec 2020 11:15) (18 - 18)  SpO2: 88% (18 Dec 2020 12:26) (88% - 96%)    PHYSICAL EXAMINATION:  GENERAL: NAD, well built  HEAD:  Atraumatic, Normocephalic  EYES:  conjunctiva and sclera clear  NECK: Supple, No JVD, Normal thyroid  CHEST/LUNG: Clear to auscultation. Clear to percussion bilaterally; No rales, rhonchi, wheezing, or rubs  HEART: Regular rate and rhythm; No murmurs, rubs, or gallops  ABDOMEN: Soft, Nontender, Nondistended; Bowel sounds present  NERVOUS SYSTEM: alert and oriented x3  EXTREMITIES:  2+ Peripheral Pulses, No clubbing, cyanosis, or edema  SKIN: warm dry                          11.8   5.46  )-----------( 353      ( 18 Dec 2020 08:09 )             36.6     12-18    136  |  102  |  14  ----------------------------<  96  3.9   |  25  |  0.52    Ca    8.6      18 Dec 2020 08:09  Phos  3.7     12-18  Mg     2.2     12-18    TPro  7.2  /  Alb  2.8<L>  /  TBili  0.3  /  DBili  x   /  AST  40  /  ALT  89<H>  /  AlkPhos  87  12-18    LIVER FUNCTIONS - ( 18 Dec 2020 08:09 )  Alb: 2.8 g/dL / Pro: 7.2 g/dL / ALK PHOS: 87 U/L / ALT: 89 U/L DA / AST: 40 U/L / GGT: x                   CAPILLARY BLOOD GLUCOSE      RADIOLOGY & ADDITIONAL TESTS:                   PGY-1 Progress Note discussed with attending    PAGER #: [[phone]] TILL 5:00 PM  PLEASE CONTACT ON CALL TEAM:  - On Call Team (Please refer to Faiza) FROM 5:00 PM - 8:30PM  - Nightfloat Team FROM 8:30 -7:30 AM    CHIEF COMPLAINT & BRIEF HOSPITAL COURSE: 55F from home [facility] 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 Placed on 4L NC in ED with SpO2 97%, given x1 dose albuterol and started on decadron. Admitted to tele for further management of COVID pna.     INTERVAL HPI/OVERNIGHT EVENTS: NAEON. Desat to 89% on 2L NC yesterday, 96% on 2L NC this am. With ambulation desat to 91% on 2L NC and 88% on RA. OOB to chair today, patient given incentive spirometry for lung strengthening. Will consider discharge tomorrow with home O2.     MEDICATIONS  (STANDING):  ascorbic acid 500 milliGRAM(s) Oral daily  cholecalciferol 2000 Unit(s) Oral daily  dexAMETHasone  Injectable 6 milliGRAM(s) IV Push daily  enoxaparin Injectable 40 milliGRAM(s) SubCutaneous daily  remdesivir  IVPB   IV Intermittent   remdesivir  IVPB 100 milliGRAM(s) IV Intermittent every 24 hours  zinc sulfate 220 milliGRAM(s) Oral daily    MEDICATIONS  (PRN):  guaiFENesin   Syrup  (Sugar-Free) 100 milliGRAM(s) Oral every 6 hours PRN Cough      REVIEW OF SYSTEMS:  CONSTITUTIONAL: no fever or fatigue, feels better  RESPIRATORY: mild cough, mild SOB, improving  CARDIOVASCULAR: No chest pain or dizziness  GASTROINTESTINAL: No abdominal pain. No nausea, vomiting; resolved diarrhea, no constipation.  GENITOURINARY: No dysuria or hematuria  NEUROLOGICAL: No headaches  SKIN: No itching or rashes     Vital Signs Last 24 Hrs  T(C): 36.6 (18 Dec 2020 11:15), Max: 36.7 (17 Dec 2020 19:20)  T(F): 97.9 (18 Dec 2020 11:15), Max: 98.1 (17 Dec 2020 19:20)  HR: 68 (18 Dec 2020 11:15) (56 - 82)  BP: 112/69 (18 Dec 2020 11:15) (101/57 - 129/67)  BP(mean): --  RR: 18 (18 Dec 2020 11:15) (18 - 18)  SpO2: 88% (18 Dec 2020 12:26) (88% - 96%)    PHYSICAL EXAMINATION:  GENERAL: NAD, well built  HEAD:  Atraumatic, Normocephalic  EYES:  conjunctiva and sclera clear  NECK: Supple, No JVD, Normal thyroid  CHEST/LUNG: Clear to auscultation. Clear to percussion bilaterally; No rales, rhonchi, wheezing, or rubs  HEART: Regular rate and rhythm; No murmurs, rubs, or gallops  ABDOMEN: Soft, Nontender, Nondistended; Bowel sounds present  NERVOUS SYSTEM: alert and oriented x3  EXTREMITIES:  2+ Peripheral Pulses, No clubbing, cyanosis, or edema  SKIN: warm dry                          11.8   5.46  )-----------( 353      ( 18 Dec 2020 08:09 )             36.6     12-18    136  |  102  |  14  ----------------------------<  96  3.9   |  25  |  0.52    Ca    8.6      18 Dec 2020 08:09  Phos  3.7     12-18  Mg     2.2     12-18    TPro  7.2  /  Alb  2.8<L>  /  TBili  0.3  /  DBili  x   /  AST  40  /  ALT  89<H>  /  AlkPhos  87  12-18    LIVER FUNCTIONS - ( 18 Dec 2020 08:09 )  Alb: 2.8 g/dL / Pro: 7.2 g/dL / ALK PHOS: 87 U/L / ALT: 89 U/L DA / AST: 40 U/L / GGT: x                   CAPILLARY BLOOD GLUCOSE      RADIOLOGY & ADDITIONAL TESTS:                   PGY-1 Progress Note discussed with attending    PAGER #: [[phone]] TILL 5:00 PM  PLEASE CONTACT ON CALL TEAM:  - On Call Team (Please refer to Faiza) FROM 5:00 PM - 8:30PM  - Nightfloat Team FROM 8:30 -7:30 AM    CHIEF COMPLAINT & BRIEF HOSPITAL COURSE: 55F from home [facility] 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 Placed on 4L NC in ED with SpO2 97%, given x1 dose albuterol and started on decadron. Admitted to tele for further management of COVID pna.     INTERVAL HPI/OVERNIGHT EVENTS: NAEON. Desat to 89% on 2L NC yesterday, 96% on 2L NC this am. Desat to 93% at rest on RA. With ambulation desat to 91% on 2L NC and 88% on RA. OOB to chair today, patient given incentive spirometry for lung strengthening. Will consider discharge tomorrow with home O2.     MEDICATIONS  (STANDING):  ascorbic acid 500 milliGRAM(s) Oral daily  cholecalciferol 2000 Unit(s) Oral daily  dexAMETHasone  Injectable 6 milliGRAM(s) IV Push daily  enoxaparin Injectable 40 milliGRAM(s) SubCutaneous daily  remdesivir  IVPB   IV Intermittent   remdesivir  IVPB 100 milliGRAM(s) IV Intermittent every 24 hours  zinc sulfate 220 milliGRAM(s) Oral daily    MEDICATIONS  (PRN):  guaiFENesin   Syrup  (Sugar-Free) 100 milliGRAM(s) Oral every 6 hours PRN Cough      REVIEW OF SYSTEMS:  CONSTITUTIONAL: no fever or fatigue, feels better  RESPIRATORY: mild cough, mild SOB, improving  CARDIOVASCULAR: No chest pain or dizziness  GASTROINTESTINAL: No abdominal pain. No nausea, vomiting; resolved diarrhea, no constipation.  GENITOURINARY: No dysuria or hematuria  NEUROLOGICAL: No headaches  SKIN: No itching or rashes     Vital Signs Last 24 Hrs  T(C): 36.6 (18 Dec 2020 11:15), Max: 36.7 (17 Dec 2020 19:20)  T(F): 97.9 (18 Dec 2020 11:15), Max: 98.1 (17 Dec 2020 19:20)  HR: 68 (18 Dec 2020 11:15) (56 - 82)  BP: 112/69 (18 Dec 2020 11:15) (101/57 - 129/67)  BP(mean): --  RR: 18 (18 Dec 2020 11:15) (18 - 18)  SpO2: 88% (18 Dec 2020 12:26) (88% - 96%)    PHYSICAL EXAMINATION:  GENERAL: NAD, well built  HEAD:  Atraumatic, Normocephalic  EYES:  conjunctiva and sclera clear  NECK: Supple, No JVD, Normal thyroid  CHEST/LUNG: Clear to auscultation. Clear to percussion bilaterally; No rales, rhonchi, wheezing, or rubs  HEART: Regular rate and rhythm; No murmurs, rubs, or gallops  ABDOMEN: Soft, Nontender, Nondistended; Bowel sounds present  NERVOUS SYSTEM: alert and oriented x3  EXTREMITIES:  2+ Peripheral Pulses, No clubbing, cyanosis, or edema  SKIN: warm dry                          11.8   5.46  )-----------( 353      ( 18 Dec 2020 08:09 )             36.6     12-18    136  |  102  |  14  ----------------------------<  96  3.9   |  25  |  0.52    Ca    8.6      18 Dec 2020 08:09  Phos  3.7     12-18  Mg     2.2     12-18    TPro  7.2  /  Alb  2.8<L>  /  TBili  0.3  /  DBili  x   /  AST  40  /  ALT  89<H>  /  AlkPhos  87  12-18    LIVER FUNCTIONS - ( 18 Dec 2020 08:09 )  Alb: 2.8 g/dL / Pro: 7.2 g/dL / ALK PHOS: 87 U/L / ALT: 89 U/L DA / AST: 40 U/L / GGT: x                   CAPILLARY BLOOD GLUCOSE      RADIOLOGY & ADDITIONAL TESTS:                   PGY-1 Progress Note discussed with attending    PAGER #: [[phone]] TILL 5:00 PM  PLEASE CONTACT ON CALL TEAM:  - On Call Team (Please refer to Faiza) FROM 5:00 PM - 8:30PM  - Nightfloat Team FROM 8:30 -7:30 AM    CHIEF COMPLAINT & BRIEF HOSPITAL COURSE: 55F from home [facility] 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 Placed on 4L NC in ED with SpO2 97%, given x1 dose albuterol and started on decadron. Admitted to tele for further management of COVID pna.     INTERVAL HPI/OVERNIGHT EVENTS: NAEON. Desat to 89% on 2L NC yesterday, 96% on 2L NC this am. Desat to 93% at rest on RA. Desat to 91% on 2L NC with exercise and 88% on RA with exercise. OOB to chair today, patient given incentive spirometry for lung strengthening. Will consider discharge tomorrow with home O2.     MEDICATIONS  (STANDING):  ascorbic acid 500 milliGRAM(s) Oral daily  cholecalciferol 2000 Unit(s) Oral daily  dexAMETHasone  Injectable 6 milliGRAM(s) IV Push daily  enoxaparin Injectable 40 milliGRAM(s) SubCutaneous daily  remdesivir  IVPB   IV Intermittent   remdesivir  IVPB 100 milliGRAM(s) IV Intermittent every 24 hours  zinc sulfate 220 milliGRAM(s) Oral daily    MEDICATIONS  (PRN):  guaiFENesin   Syrup  (Sugar-Free) 100 milliGRAM(s) Oral every 6 hours PRN Cough      REVIEW OF SYSTEMS:  CONSTITUTIONAL: no fever or fatigue, feels better  RESPIRATORY: mild cough, mild SOB, improving  CARDIOVASCULAR: No chest pain or dizziness  GASTROINTESTINAL: No abdominal pain. No nausea, vomiting; resolved diarrhea, no constipation.  GENITOURINARY: No dysuria or hematuria  NEUROLOGICAL: No headaches  SKIN: No itching or rashes     Vital Signs Last 24 Hrs  T(C): 36.6 (18 Dec 2020 11:15), Max: 36.7 (17 Dec 2020 19:20)  T(F): 97.9 (18 Dec 2020 11:15), Max: 98.1 (17 Dec 2020 19:20)  HR: 68 (18 Dec 2020 11:15) (56 - 82)  BP: 112/69 (18 Dec 2020 11:15) (101/57 - 129/67)  BP(mean): --  RR: 18 (18 Dec 2020 11:15) (18 - 18)  SpO2: 88% (18 Dec 2020 12:26) (88% - 96%)    PHYSICAL EXAMINATION:  GENERAL: NAD, well built  HEAD:  Atraumatic, Normocephalic  EYES:  conjunctiva and sclera clear  NECK: Supple, No JVD, Normal thyroid  CHEST/LUNG: Clear to auscultation. Clear to percussion bilaterally; No rales, rhonchi, wheezing, or rubs  HEART: Regular rate and rhythm; No murmurs, rubs, or gallops  ABDOMEN: Soft, Nontender, Nondistended; Bowel sounds present  NERVOUS SYSTEM: alert and oriented x3  EXTREMITIES:  2+ Peripheral Pulses, No clubbing, cyanosis, or edema  SKIN: warm dry                          11.8   5.46  )-----------( 353      ( 18 Dec 2020 08:09 )             36.6     12-18    136  |  102  |  14  ----------------------------<  96  3.9   |  25  |  0.52    Ca    8.6      18 Dec 2020 08:09  Phos  3.7     12-18  Mg     2.2     12-18    TPro  7.2  /  Alb  2.8<L>  /  TBili  0.3  /  DBili  x   /  AST  40  /  ALT  89<H>  /  AlkPhos  87  12-18    LIVER FUNCTIONS - ( 18 Dec 2020 08:09 )  Alb: 2.8 g/dL / Pro: 7.2 g/dL / ALK PHOS: 87 U/L / ALT: 89 U/L DA / AST: 40 U/L / GGT: x                   CAPILLARY BLOOD GLUCOSE      RADIOLOGY & ADDITIONAL TESTS:

## 2020-12-18 NOTE — DISCHARGE NOTE NURSING/CASE MANAGEMENT/SOCIAL WORK - NSSCNAMETXT_GEN_ALL_CORE
[facility] (formerly [facility]) [phone]

## 2020-12-18 NOTE — DISCHARGE NOTE NURSING/CASE MANAGEMENT/SOCIAL WORK - PATIENT PORTAL LINK FT
You can access the FollowMyHealth Patient Portal offered by [facility] by registering at the following website: [email or web address]. By joining [employer]’s FollowMyHealth portal, you will also be able to view your health information using other applications (apps) compatible with our system.

## 2020-12-18 NOTE — DISCHARGE NOTE NURSING/CASE MANAGEMENT/SOCIAL WORK - NSDPDISTO_GEN_ALL_CORE
Spoke with patient-states recently experiences \"heart racing\" denies chest pain, SOB and also episodes vertigo. At time of call no symptoms. Scheduled appointment today.   Home

## 2020-12-18 NOTE — PROGRESS NOTE ADULT - SUBJECTIVE AND OBJECTIVE BOX
HPI:  Patient, a 55 year old female from home, with PMH of right sided lobular CA in situ breast s/p mastectomy presents to the ED c/o shortness of breath today. Patient reports fever, cough and myalgias x 1.5 week. She was tested positive on 8th dec and has been having symptoms since the 4th December. Patient's  was also COVID + after travelling recently. Patient reports having shortness of breath and chest tightness today. Saturation at home was 88% on RA. Last fever was 2 days ago. She also complains of diarrhea and decreased appetite. Patient denies chills, leg pain/swelling, or any other complaints.    In ED, saturating 97% on 4L NC. She received decadron and albuterol in the ED. (15 Dec 2020 16:35)      Patient is a 55y old  Female who presents with a chief complaint of SOB (17 Dec 2020 12:37)      INTERVAL HPI/OVERNIGHT EVENTS:  T(C): 36.6 (12-18-20 @ 11:15), Max: 36.7 (12-17-20 @ 19:20)  HR: 68 (12-18-20 @ 11:15) (56 - 82)  BP: 112/69 (12-18-20 @ 11:15) (101/57 - 129/67)  RR: 18 (12-18-20 @ 11:15) (18 - 18)  SpO2: 88% (12-18-20 @ 12:26) (88% - 96%)  Wt(kg): --  I&O's Summary    18 Dec 2020 07:01  -  18 Dec 2020 13:32  --------------------------------------------------------  IN: 450 mL / OUT: 0 mL / NET: 450 mL        REVIEW OF SYSTEMS: denies fever, chills, SOB, palpitations, chest pain, abdominal pain, nausea, vomitting, diarrhea, constipation, dizziness    MEDICATIONS  (STANDING):  ascorbic acid 500 milliGRAM(s) Oral daily  cholecalciferol 2000 Unit(s) Oral daily  dexAMETHasone  Injectable 6 milliGRAM(s) IV Push daily  enoxaparin Injectable 40 milliGRAM(s) SubCutaneous daily  remdesivir  IVPB   IV Intermittent   remdesivir  IVPB 100 milliGRAM(s) IV Intermittent every 24 hours  zinc sulfate 220 milliGRAM(s) Oral daily    MEDICATIONS  (PRN):  guaiFENesin   Syrup  (Sugar-Free) 100 milliGRAM(s) Oral every 6 hours PRN Cough      PHYSICAL EXAM:  GENERAL: NAD, well-groomed, well-developed  HEAD:  Atraumatic, Normocephalic  EYES: EOMI, PERRLA, conjunctiva and sclera clear  ENMT: No tonsillar erythema, exudates, or enlargement; Moist mucous membranes, Good dentition, No lesions  NECK: Supple, No JVD, Normal thyroid  NERVOUS SYSTEM:  Alert & Oriented X3, Good concentration; Motor Strength 5/5 B/L upper and lower extremities; DTRs 2+ intact and symmetric  CHEST/LUNG: Clear to percussion bilaterally; No rales, rhonchi, wheezing, or rubs  HEART: Regular rate and rhythm; No murmurs, rubs, or gallops  ABDOMEN: Soft, Nontender, Nondistended; Bowel sounds present  EXTREMITIES:  2+ Peripheral Pulses, No clubbing, cyanosis, or edema  LYMPH: No lymphadenopathy noted  SKIN: No rashes or lesions  LABS:                        11.8   5.46  )-----------( 353      ( 18 Dec 2020 08:09 )             36.6     12-18    136  |  102  |  14  ----------------------------<  96  3.9   |  25  |  0.52    Ca    8.6      18 Dec 2020 08:09  Phos  3.7     12-18  Mg     2.2     12-18    TPro  7.2  /  Alb  2.8<L>  /  TBili  0.3  /  DBili  x   /  AST  40  /  ALT  89<H>  /  AlkPhos  87  12-18        CAPILLARY BLOOD GLUCOSE

## 2020-12-19 LAB
ALBUMIN SERPL ELPH-MCNC: 2.9 G/DL — LOW (ref 3.5–5)
ALP SERPL-CCNC: 84 U/L — SIGNIFICANT CHANGE UP (ref 40–120)
ALT FLD-CCNC: 76 U/L DA — HIGH (ref 10–60)
ANION GAP SERPL CALC-SCNC: 11 MMOL/L — SIGNIFICANT CHANGE UP (ref 5–17)
AST SERPL-CCNC: 23 U/L — SIGNIFICANT CHANGE UP (ref 10–40)
BILIRUB SERPL-MCNC: 0.3 MG/DL — SIGNIFICANT CHANGE UP (ref 0.2–1.2)
BUN SERPL-MCNC: 14 MG/DL — SIGNIFICANT CHANGE UP (ref 7–18)
CALCIUM SERPL-MCNC: 8.9 MG/DL — SIGNIFICANT CHANGE UP (ref 8.4–10.5)
CHLORIDE SERPL-SCNC: 102 MMOL/L — SIGNIFICANT CHANGE UP (ref 96–108)
CO2 SERPL-SCNC: 26 MMOL/L — SIGNIFICANT CHANGE UP (ref 22–31)
CREAT SERPL-MCNC: 0.57 MG/DL — SIGNIFICANT CHANGE UP (ref 0.5–1.3)
D DIMER BLD IA.RAPID-MCNC: 228 NG/ML DDU — SIGNIFICANT CHANGE UP
FERRITIN SERPL-MCNC: 545 NG/ML — HIGH (ref 15–150)
GLUCOSE SERPL-MCNC: 87 MG/DL — SIGNIFICANT CHANGE UP (ref 70–99)
HCT VFR BLD CALC: 38.2 % — SIGNIFICANT CHANGE UP (ref 34.5–45)
HGB BLD-MCNC: 12.1 G/DL — SIGNIFICANT CHANGE UP (ref 11.5–15.5)
LDH SERPL L TO P-CCNC: 233 U/L — HIGH (ref 120–225)
MAGNESIUM SERPL-MCNC: 2.3 MG/DL — SIGNIFICANT CHANGE UP (ref 1.6–2.6)
MCHC RBC-ENTMCNC: 25.2 PG — LOW (ref 27–34)
MCHC RBC-ENTMCNC: 31.7 GM/DL — LOW (ref 32–36)
MCV RBC AUTO: 79.6 FL — LOW (ref 80–100)
NRBC # BLD: 0 /100 WBCS — SIGNIFICANT CHANGE UP (ref 0–0)
PHOSPHATE SERPL-MCNC: 3.3 MG/DL — SIGNIFICANT CHANGE UP (ref 2.5–4.5)
PLATELET # BLD AUTO: 391 K/UL — SIGNIFICANT CHANGE UP (ref 150–400)
POTASSIUM SERPL-MCNC: 3.9 MMOL/L — SIGNIFICANT CHANGE UP (ref 3.5–5.3)
POTASSIUM SERPL-SCNC: 3.9 MMOL/L — SIGNIFICANT CHANGE UP (ref 3.5–5.3)
PROT SERPL-MCNC: 7.5 G/DL — SIGNIFICANT CHANGE UP (ref 6–8.3)
RBC # BLD: 4.8 M/UL — SIGNIFICANT CHANGE UP (ref 3.8–5.2)
RBC # FLD: 13.5 % — SIGNIFICANT CHANGE UP (ref 10.3–14.5)
SODIUM SERPL-SCNC: 139 MMOL/L — SIGNIFICANT CHANGE UP (ref 135–145)
WBC # BLD: 5.41 K/UL — SIGNIFICANT CHANGE UP (ref 3.8–10.5)
WBC # FLD AUTO: 5.41 K/UL — SIGNIFICANT CHANGE UP (ref 3.8–10.5)

## 2020-12-19 RX ORDER — DEXAMETHASONE 0.5 MG/5ML
1 ELIXIR ORAL
Qty: 5 | Refills: 0
Start: 2020-12-19 | End: 2020-12-23

## 2020-12-19 RX ADMIN — ENOXAPARIN SODIUM 40 MILLIGRAM(S): 100 INJECTION SUBCUTANEOUS at 11:46

## 2020-12-19 RX ADMIN — Medication 2000 UNIT(S): at 11:45

## 2020-12-19 RX ADMIN — Medication 100 MILLIGRAM(S): at 05:45

## 2020-12-19 RX ADMIN — REMDESIVIR 500 MILLIGRAM(S): 5 INJECTION INTRAVENOUS at 19:00

## 2020-12-19 RX ADMIN — ZINC SULFATE TAB 220 MG (50 MG ZINC EQUIVALENT) 220 MILLIGRAM(S): 220 (50 ZN) TAB at 11:46

## 2020-12-19 RX ADMIN — Medication 500 MILLIGRAM(S): at 11:46

## 2020-12-19 RX ADMIN — Medication 6 MILLIGRAM(S): at 05:45

## 2020-12-19 NOTE — PROGRESS NOTE ADULT - PROBLEM SELECTOR PROBLEM 1
Acute hypoxemic respiratory failure due to COVID-19

## 2020-12-19 NOTE — DISCHARGE NOTE PROVIDER - NSDCCPCAREPLAN_GEN_ALL_CORE_FT
PRINCIPAL DISCHARGE DIAGNOSIS  Diagnosis: COVID-19 virus infection  Assessment and Plan of Treatment:       SECONDARY DISCHARGE DIAGNOSES  Diagnosis: Hypoxemia requiring supplemental oxygen  Assessment and Plan of Treatment:      PRINCIPAL DISCHARGE DIAGNOSIS  Diagnosis: COVID-19 virus infection  Assessment and Plan of Treatment: You presented 12/15 with x1 day worsening SOB, diarrhea, and poor appetite, and fever, cough, myalgias since 12/4 with +COVID outpatient 12/8. Placed on 4L NC in ED with SpO2 97%, given x1 dose albuterol and started on decadron. Admitted to tele for further management of COVID pna.   S/p remdesivir 12/15-19. Decadron 12/15-24, will dc with remaining dosages. Spo2 88% on RA with exercise. SpO2 91% on 2L NC with exercise. SpO2 96% at rest with 2L NC. SpO2 93% at rest on RA . You are going to be discharge home with 2L home O2.      SECONDARY DISCHARGE DIAGNOSES  Diagnosis: Hypoxemia requiring supplemental oxygen  Assessment and Plan of Treatment: You presented 12/15 with x1 day worsening SOB, diarrhea, and poor appetite, and fever, cough, myalgias since 12/4 with +COVID outpatient 12/8. Placed on 4L NC in ED with SpO2 97%, given x1 dose albuterol and started on decadron. Admitted to tele for further management of COVID pna.   S/p remdesivir 12/15-19. Decadron 12/15-24, will dc with remaining dosages. Spo2 88% on RA with exercise. SpO2 91% on 2L NC with exercise. SpO2 96% at rest with 2L NC. SpO2 93% at rest on RA . You are going to be discharge home with 2L home O2.     PRINCIPAL DISCHARGE DIAGNOSIS  Diagnosis: COVID-19 virus infection  Assessment and Plan of Treatment: You presented 12/15 with x1 day worsening SOB, diarrhea, and poor appetite, and fever, cough, myalgias since 12/4 with +COVID outpatient 12/8. Placed on 4L NC in ED with SpO2 97%, given x1 dose albuterol and started on decadron. Admitted to tele for further management of COVID pna.   S/p remdesivir 12/15-19. Decadron 12/15-24, will dc with remaining dosages. Spo2 88% on RA with exercise. SpO2 91% on 2L NC with exercise. SpO2 96% at rest with 2L NC. SpO2 93% at rest on RA . You are going to be discharge home with 2L home O2. Please quaratine yourself for 14 days. your day 1 would count the day you were diagnosed with COVID. Avoid socialising with people in that period and avoid going outside. Drink and eat healthy.      SECONDARY DISCHARGE DIAGNOSES  Diagnosis: Hypoxemia requiring supplemental oxygen  Assessment and Plan of Treatment: You presented 12/15 with x1 day worsening SOB, diarrhea, and poor appetite, and fever, cough, myalgias since 12/4 with +COVID outpatient 12/8. Placed on 4L NC in ED with SpO2 97%, given x1 dose albuterol and started on decadron. Admitted to tele for further management of COVID pna.   S/p remdesivir 12/15-19. Decadron 12/15-24, will dc with remaining dosages. Spo2 88% on RA with exercise. SpO2 91% on 2L NC with exercise. SpO2 96% at rest with 2L NC. SpO2 93% at rest on RA . You are going to be discharge home with 2L home O2.

## 2020-12-19 NOTE — PROGRESS NOTE ADULT - SUBJECTIVE AND OBJECTIVE BOX
HPI:  Patient, a 55 year old female from home, with PMH of right sided lobular CA in situ breast s/p mastectomy presents to the ED c/o shortness of breath today. Patient reports fever, cough and myalgias x 1.5 week. She was tested positive on 8th dec and has been having symptoms since the 4th December. Patient's  was also COVID + after travelling recently. Patient reports having shortness of breath and chest tightness today. Saturation at home was 88% on RA. Last fever was 2 days ago. She also complains of diarrhea and decreased appetite. Patient denies chills, leg pain/swelling, or any other complaints.    In ED, saturating 97% on 4L NC. She received decadron and albuterol in the ED. (15 Dec 2020 16:35)      Patient is a 55y old  Female who presents with a chief complaint of SOB (19 Dec 2020 11:50)      INTERVAL HPI/OVERNIGHT EVENTS:  T(C): 36.6 (12-19-20 @ 11:22), Max: 36.8 (12-18-20 @ 15:15)  HR: 68 (12-19-20 @ 11:22) (64 - 68)  BP: 107/60 (12-19-20 @ 11:22) (102/65 - 119/74)  RR: 18 (12-19-20 @ 11:22) (18 - 20)  SpO2: 95% (12-19-20 @ 12:25) (92% - 96%)  Wt(kg): --  I&O's Summary    18 Dec 2020 07:01  -  19 Dec 2020 07:00  --------------------------------------------------------  IN: 525 mL / OUT: 400 mL / NET: 125 mL        REVIEW OF SYSTEMS: denies fever, chills, SOB, palpitations, chest pain, abdominal pain, nausea, vomitting, diarrhea, constipation, dizziness    MEDICATIONS  (STANDING):  ascorbic acid 500 milliGRAM(s) Oral daily  cholecalciferol 2000 Unit(s) Oral daily  dexAMETHasone  Injectable 6 milliGRAM(s) IV Push daily  enoxaparin Injectable 40 milliGRAM(s) SubCutaneous daily  remdesivir  IVPB   IV Intermittent   remdesivir  IVPB 100 milliGRAM(s) IV Intermittent every 24 hours  zinc sulfate 220 milliGRAM(s) Oral daily    MEDICATIONS  (PRN):  guaiFENesin   Syrup  (Sugar-Free) 100 milliGRAM(s) Oral every 6 hours PRN Cough      PHYSICAL EXAM:  GENERAL: NAD, well-groomed, well-developed  HEAD:  Atraumatic, Normocephalic  EYES: EOMI, PERRLA, conjunctiva and sclera clear  ENMT: No tonsillar erythema, exudates, or enlargement; Moist mucous membranes, Good dentition, No lesions  NECK: Supple, No JVD, Normal thyroid  NERVOUS SYSTEM:  Alert & Oriented X3, Good concentration; Motor Strength 5/5 B/L upper and lower extremities; DTRs 2+ intact and symmetric  CHEST/LUNG: Clear to percussion bilaterally; No rales, rhonchi, wheezing, or rubs  HEART: Regular rate and rhythm; No murmurs, rubs, or gallops  ABDOMEN: Soft, Nontender, Nondistended; Bowel sounds present  EXTREMITIES:  2+ Peripheral Pulses, No clubbing, cyanosis, or edema  LYMPH: No lymphadenopathy noted  SKIN: No rashes or lesions  LABS:                        12.1   5.41  )-----------( 391      ( 19 Dec 2020 08:01 )             38.2     12-19    139  |  102  |  14  ----------------------------<  87  3.9   |  26  |  0.57    Ca    8.9      19 Dec 2020 08:01  Phos  3.3     12-19  Mg     2.3     12-19    TPro  7.5  /  Alb  2.9<L>  /  TBili  0.3  /  DBili  x   /  AST  23  /  ALT  76<H>  /  AlkPhos  84  12-19        CAPILLARY BLOOD GLUCOSE

## 2020-12-19 NOTE — PROGRESS NOTE ADULT - PROBLEM SELECTOR PLAN 1
x1 day worsening SOB, diarrhea, and poor appetite, since 12/4 fever, cough, myalgias, +COVID outpatient 12/8, SPO2 88% at home on RA, 92% RA ED  -from 4L to 2L NC overnight, tolerating, SpO2 low of 89, this am 96%  -with ambulation today desat to 91% with 2L NC and 88% on RA  -will consider dc home tomorrow with O2
x1 day worsening SOB, diarrhea, and poor appetite, since 12/4 fever, cough, myalgias, +COVID outpatient 12/8, SPO2 88% at home on RA, 92% RA ED  -tolerating 2L NC, SpO2 mid-90s, medically stable for dc pending home O2  -with ambulation desat to 91% with 2L NC and 88% on RA
x1 day worsening SOB, diarrhea, and poor appetite, since 12/4 fever, cough, myalgias, +COVID outpatient 12/8, SPO2 88% at home on RA, 92% RA ED  -from 4L to 2L NC overnight, tolerating, SpO2 high 90s  -continue and monitor
x1 day worsening SOB, diarrhea, and poor appetite, since 12/4 fever, cough, myalgias, +COVID outpatient 12/8, SPO2 88% at home on RA, 92% RA ED  -from 4L to 2L NC overnight, tolerating, SpO2 high 90s  -back to 4L NC this am, uncomfortable with flow, decreased to 2L, will monitor

## 2020-12-19 NOTE — PROGRESS NOTE ADULT - PROBLEM SELECTOR PLAN 4
[ ] Previous VTE                                    3  [ ] Thrombophilia                                  2  [ ] Lower limb paralysis                        2  (unable to hold up >15 seconds)    [ ] Current Cancer (within 6 months)        2   [ ] Immobilization > 24 hrs                    1  [ ] ICU/CCU stay > 24 hrs                      1  [ ] Age > 60                                         1   lovenox since pt is covid and has high risk of VTE, ddimer 261, monitor

## 2020-12-19 NOTE — DISCHARGE NOTE PROVIDER - HOSPITAL COURSE
55F from home PMH 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 Placed on 4L NC in ED with SpO2 97%, given x1 dose albuterol and started on decadron. Admitted to tele for further management of COVID pna.   S/p remdesivir 12/15-19. Decadron 12/15-24, will dc with remaining dosages.    55F from home PMH 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 Placed on 4L NC in ED with SpO2 97%, given x1 dose albuterol and started on decadron. Admitted to [facility] for further management of COVID pna.   S/p remdesivir 12/15-19. Decadron 12/15-24, will dc with remaining dosages. Spo2 88% on RA with exercise. SpO2 91% on 2L NC with exercise. SpO2 96% at rest with 2L NC. SpO2 93% at rest on RA .Will discharge home with 2L home O2.

## 2020-12-19 NOTE — PROGRESS NOTE ADULT - PROBLEM SELECTOR PLAN 3
patient s/p right mastectomy  was on tamoxifen but recently stopped by her oncologist  f/u with oncologist outpatient  primary team to confirm with oncologist regarding continuing to hold or continue tamoxifen

## 2020-12-19 NOTE — PROGRESS NOTE ADULT - SUBJECTIVE AND OBJECTIVE BOX
PGY-1 Progress Note discussed with attending    PAGER #: [[phone]] TILL 5:00 PM  PLEASE CONTACT ON CALL TEAM:  - On Call Team (Please refer to Faiza) FROM 5:00 PM - 8:30PM  - Nightfloat Team FROM 8:30 -7:30 AM    CHIEF COMPLAINT & BRIEF HOSPITAL COURSE: 55F from home [facility] 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 Placed on 4L NC in ED with SpO2 97%, given x1 dose albuterol and started on decadron. Admitted to tele for further management of COVID pna.     INTERVAL HPI/OVERNIGHT EVENTS: NAEON. Continuing to sat mid-90s on 2L NC. Reports feeling improved. Discharge pending set up of home O2 and services.     MEDICATIONS  (STANDING):  ascorbic acid 500 milliGRAM(s) Oral daily  cholecalciferol 2000 Unit(s) Oral daily  dexAMETHasone  Injectable 6 milliGRAM(s) IV Push daily  enoxaparin Injectable 40 milliGRAM(s) SubCutaneous daily  remdesivir  IVPB   IV Intermittent   remdesivir  IVPB 100 milliGRAM(s) IV Intermittent every 24 hours  zinc sulfate 220 milliGRAM(s) Oral daily    MEDICATIONS  (PRN):  guaiFENesin   Syrup  (Sugar-Free) 100 milliGRAM(s) Oral every 6 hours PRN Cough          REVIEW OF SYSTEMS:  CONSTITUTIONAL: no fever or fatigue, feels better  RESPIRATORY: mild cough, mild SOB, improving  CARDIOVASCULAR: No chest pain or dizziness  GASTROINTESTINAL: No abdominal pain. No nausea, vomiting; resolved diarrhea, no constipation.  GENITOURINARY: No dysuria or hematuria  NEUROLOGICAL: No headaches  SKIN: No itching or rashes      Vital Signs Last 24 Hrs  T(C): 36.6 (19 Dec 2020 11:22), Max: 36.8 (18 Dec 2020 15:15)  T(F): 97.8 (19 Dec 2020 11:22), Max: 98.3 (18 Dec 2020 15:15)  HR: 68 (19 Dec 2020 11:22) (64 - 68)  BP: 107/60 (19 Dec 2020 11:22) (102/65 - 119/74)  BP(mean): --  RR: 18 (19 Dec 2020 11:22) (18 - 20)  SpO2: 96% (19 Dec 2020 11:22) (88% - 96%)    PHYSICAL EXAMINATION:  GENERAL: NAD, well built  HEAD:  Atraumatic, Normocephalic  EYES:  conjunctiva and sclera clear  NECK: Supple, No JVD, Normal thyroid  CHEST/LUNG: Clear to auscultation. Clear to percussion bilaterally; No rales, rhonchi, wheezing, or rubs  HEART: Regular rate and rhythm; No murmurs, rubs, or gallops  ABDOMEN: Soft, Nontender, Nondistended; Bowel sounds present  NERVOUS SYSTEM: alert and oriented x3  EXTREMITIES:  2+ Peripheral Pulses, No clubbing, cyanosis, or edema  SKIN: warm dry                            12.1   5.41  )-----------( 391      ( 19 Dec 2020 08:01 )             38.2     12-19    139  |  102  |  14  ----------------------------<  87  3.9   |  26  |  0.57    Ca    8.9      19 Dec 2020 08:01  Phos  3.3     12-19  Mg     2.3     12-19    TPro  7.5  /  Alb  2.9<L>  /  TBili  0.3  /  DBili  x   /  AST  23  /  ALT  76<H>  /  AlkPhos  84  12-19    LIVER FUNCTIONS - ( 19 Dec 2020 08:01 )  Alb: 2.9 g/dL / Pro: 7.5 g/dL / ALK PHOS: 84 U/L / ALT: 76 U/L DA / AST: 23 U/L / GGT: x                   CAPILLARY BLOOD GLUCOSE      RADIOLOGY & ADDITIONAL TESTS:

## 2020-12-19 NOTE — PROGRESS NOTE ADULT - PROBLEM SELECTOR PLAN 2
history and management as above  -CXR on admission b/l patchy opacities  -decadron 12/15-24  -remdesivir 12/15-19, ID approved  -vitamin C/D, zinc  -robitussin prn cough  -ddimer 288 this am, monitor q3d, APRs elevated  -trop negative x2
history and management as above  -CXR on admission b/l patchy opacities  -decadron 12/15-24  -remdesivir 12/15-19, ID approved  -vitamin C/D, zinc  -robitussin prn cough  -ddimer 288 12/17, monitor q3d, APRs elevated  -trop negative x2
history and management as above  -CXR on admission b/l patchy opacities  -decadron 12/15-24  -remdesivir 12/15-19, ID approved  -vitamin C/D, zinc  -robitussin prn cough  -ddimer 288 12/17, monitor q3d, APRs elevated  -trop negative x2
history and management as above  -CXR on admission b/l patchy opacities  -decadron 12/15-24  -remdesivir 12/15-19, ID approved  -vitamin C/D, zinc  -robitussin prn cough  -fu ESR, CRP, LDH, D dimers  -trop negative x2

## 2020-12-20 VITALS
HEART RATE: 71 BPM | RESPIRATION RATE: 18 BRPM | SYSTOLIC BLOOD PRESSURE: 118 MMHG | TEMPERATURE: 98 F | DIASTOLIC BLOOD PRESSURE: 75 MMHG | OXYGEN SATURATION: 95 %

## 2020-12-20 LAB
ALBUMIN SERPL ELPH-MCNC: 2.8 G/DL — LOW (ref 3.5–5)
ALP SERPL-CCNC: 81 U/L — SIGNIFICANT CHANGE UP (ref 40–120)
ALT FLD-CCNC: 59 U/L DA — SIGNIFICANT CHANGE UP (ref 10–60)
ANION GAP SERPL CALC-SCNC: 9 MMOL/L — SIGNIFICANT CHANGE UP (ref 5–17)
AST SERPL-CCNC: 34 U/L — SIGNIFICANT CHANGE UP (ref 10–40)
BILIRUB SERPL-MCNC: 0.4 MG/DL — SIGNIFICANT CHANGE UP (ref 0.2–1.2)
BUN SERPL-MCNC: 15 MG/DL — SIGNIFICANT CHANGE UP (ref 7–18)
CALCIUM SERPL-MCNC: 9 MG/DL — SIGNIFICANT CHANGE UP (ref 8.4–10.5)
CHLORIDE SERPL-SCNC: 104 MMOL/L — SIGNIFICANT CHANGE UP (ref 96–108)
CO2 SERPL-SCNC: 24 MMOL/L — SIGNIFICANT CHANGE UP (ref 22–31)
CREAT SERPL-MCNC: 0.58 MG/DL — SIGNIFICANT CHANGE UP (ref 0.5–1.3)
GLUCOSE SERPL-MCNC: 92 MG/DL — SIGNIFICANT CHANGE UP (ref 70–99)
HCT VFR BLD CALC: 36.2 % — SIGNIFICANT CHANGE UP (ref 34.5–45)
HGB BLD-MCNC: 11.6 G/DL — SIGNIFICANT CHANGE UP (ref 11.5–15.5)
MAGNESIUM SERPL-MCNC: 2.3 MG/DL — SIGNIFICANT CHANGE UP (ref 1.6–2.6)
MCHC RBC-ENTMCNC: 25.4 PG — LOW (ref 27–34)
MCHC RBC-ENTMCNC: 32 GM/DL — SIGNIFICANT CHANGE UP (ref 32–36)
MCV RBC AUTO: 79.4 FL — LOW (ref 80–100)
NRBC # BLD: 0 /100 WBCS — SIGNIFICANT CHANGE UP (ref 0–0)
PHOSPHATE SERPL-MCNC: 4 MG/DL — SIGNIFICANT CHANGE UP (ref 2.5–4.5)
PLATELET # BLD AUTO: 418 K/UL — HIGH (ref 150–400)
POTASSIUM SERPL-MCNC: 4.8 MMOL/L — SIGNIFICANT CHANGE UP (ref 3.5–5.3)
POTASSIUM SERPL-SCNC: 4.8 MMOL/L — SIGNIFICANT CHANGE UP (ref 3.5–5.3)
PROT SERPL-MCNC: 7.2 G/DL — SIGNIFICANT CHANGE UP (ref 6–8.3)
RBC # BLD: 4.56 M/UL — SIGNIFICANT CHANGE UP (ref 3.8–5.2)
RBC # FLD: 13.3 % — SIGNIFICANT CHANGE UP (ref 10.3–14.5)
SODIUM SERPL-SCNC: 137 MMOL/L — SIGNIFICANT CHANGE UP (ref 135–145)
WBC # BLD: 6.27 K/UL — SIGNIFICANT CHANGE UP (ref 3.8–10.5)
WBC # FLD AUTO: 6.27 K/UL — SIGNIFICANT CHANGE UP (ref 3.8–10.5)

## 2020-12-20 RX ORDER — DEXAMETHASONE 0.5 MG/5ML
1 ELIXIR ORAL
Qty: 5 | Refills: 0
Start: 2020-12-20 | End: 2020-12-24

## 2020-12-20 RX ADMIN — ZINC SULFATE TAB 220 MG (50 MG ZINC EQUIVALENT) 220 MILLIGRAM(S): 220 (50 ZN) TAB at 12:12

## 2020-12-20 RX ADMIN — Medication 6 MILLIGRAM(S): at 06:06

## 2020-12-20 RX ADMIN — Medication 500 MILLIGRAM(S): at 12:12

## 2020-12-20 RX ADMIN — ENOXAPARIN SODIUM 40 MILLIGRAM(S): 100 INJECTION SUBCUTANEOUS at 12:12

## 2020-12-20 RX ADMIN — Medication 2000 UNIT(S): at 12:12

## 2020-12-20 NOTE — PROGRESS NOTE ADULT - ASSESSMENT
M E D I C A L   A T T E N D I N G    F O L L O W    U P   N O T E                                     ------------------------------------------------------------------------------------------------    patient evaluated by me, case discussed with team, chart, medications, and physical exam reviewed, labs / tests  and vitals reviewed by me, as bellow.   (covering today)   Patient is stable for discharge today.  pt overall doing well, off O2, ambulating, rare cough,   Patient to follow up with  PMD  See discharge document for full note.  can finish Dexa dose > PO  discussed with RN, resident covering..       ==================>> MEDICATIONS <<====================    ascorbic acid 500 milliGRAM(s) Oral daily  cholecalciferol 2000 Unit(s) Oral daily  dexAMETHasone  Injectable 6 milliGRAM(s) IV Push daily  enoxaparin Injectable 40 milliGRAM(s) SubCutaneous daily  zinc sulfate 220 milliGRAM(s) Oral daily    MEDICATIONS  (PRN):  guaiFENesin   Syrup  (Sugar-Free) 100 milliGRAM(s) Oral every 6 hours PRN Cough    ___________  Active diet:  Diet, Regular  ___________________    ==================>> VITAL SIGNS <<==================    T(C): 36.7 (12-20-20 @ 11:44), Max: 36.9 (12-19-20 @ 19:31)  HR: 71 (12-20-20 @ 11:44) (57 - 71)  BP: 118/75 (12-20-20 @ 11:44) (108/49 - 121/59)  RR: 18 (12-20-20 @ 11:44) (18 - 18)  SpO2: 95% (12-20-20 @ 11:44) (93% - 97%)        ==================>> LAB AND IMAGING <<==================                        11.6   6.27  )-----------( 418      ( 20 Dec 2020 06:51 )             36.2        12-20    137  |  104  |  15  ----------------------------<  92  4.8   |  24  |  0.58    Ca    9.0      20 Dec 2020 06:51  Phos  4.0     12-20  Mg     2.3     12-20    TPro  7.2  /  Alb  2.8<L>  /  TBili  0.4  /  DBili  x   /  AST  34  /  ALT  59  /  AlkPhos  81  12-20     TSH:      1.57   (12-16-20)           Lipid profile:  (12-16-20)     Total: 133     LDL  : (p)     HDL  :35     TG   :133     HgA1C:     (12-16-20)      5.6    WBC count:   6.27 <<== ,  5.41 <<== ,  5.46 <<== ,  4.60 <<== ,  5.00 <<== ,  5.15 <<==   Hemoglobin:   11.6 <<==,  12.1 <<==,  11.8 <<==,  12.4 <<==,  11.9 <<==,  11.8 <<==  platelets:  418 <==, 391 <==, 353 <==, 253 <==, 234 <==, 211 <==    Creatinine:  0.58  <<==, 0.57  <<==, 0.52  <<==, 0.63  <<==, 0.60  <<==, 0.57  <<==  Sodium:   137  <==, 139  <==, 136  <==, 137  <==, 136  <==, 135  <==       AST:          34 <== , 23 <== , 40 <== , 60 <== , 67 <==      ALT:        59  <== , 76  <== , 89  <== , 97  <== , 81  <==      AP:        81  <=, 84  <=, 87  <=, 97  <=, 98  <=     Bili:        0.4  <=, 0.3  <=, 0.3  <=, 0.4  <=, 0.4  <=       _______________________  C U L T U R E S :    COVID-19 IgG Antibody Index: <0.10 (12-16-20 @ 00:40)    SARS-CoV-2: Detected (12-15-20 @ 21:39)  
seen and eaxamine  pt stats she is feeling better  no complaints  no cp or sob   her O2 goees low when walks    labs noted   covid pneumonia  on remdesivir, dexamethasone  oob in chair , pulm exercise advised  
seen and examined vsstable afebrile physical unchaged  no cp or sob or palp.  comfortable on edge of bed  just took out O2 a few mnts ago looks ok   covid pneumonia  awaiting for O2 supplies to home
55 year old female from home, with PMH of right sided lobular CA in situ breast s/p mastectomy presents to the ED c/o shortness of breath today and fever, cough for a week. COVID known positive. CXR shows infiltrates bilaterally.      Problem/Plan - 1:  ·  Problem: Acute hypoxemic respiratory failure due to COVID-19.  Plan: Improving so titrating Oxygen   was saturating at 88% on RA at home, 92% in ED on RA  currently 97% on 4L   c/w oxygen supplementation and monitor sats.      Problem/Plan - 2:  ·  Problem: Pneumonia due to COVID-19 virus.  Plan: Clinically better.   low grade fever in ED  CXR shows bilateral patchy opacities  COVID + outpatient   s/p decadron in ED  c/w dexamethasone 6mg  and  Remdesevir .  will continue with vitamin D, zinc, vitamin C  prn robitussin for cough   f/u COVID markers ESR, CRP, LDH, D dimers, troponin.      Problem/Plan - 3:  ·  Problem: Breast CA.  Plan: patient s/p right mastectomy  was on tamoxifen but recently stopped by her oncologist  f/u with oncologist outpatient      Problem/Plan - 4:  ·  Problem: Need for prophylactic measure.  Plan:   lovenox since pt is covid and has high risk of VTE.       
55 year old female from home, with PMH of right sided lobular CA in situ breast s/p mastectomy presents to the ED c/o shortness of breath today and fever, cough for a week. COVID known positive. CXR shows infiltrates bilaterally.      Problem/Plan - 1:  ·  Problem: Acute hypoxemic respiratory failure due to COVID-19.  Plan: Improving so weaning off  Oxygen .  was saturating at 88% on RA at home, 92% in ED on RA     Problem/Plan - 2:  ·  Problem: Pneumonia due to COVID-19 virus.  Plan: Clinically better.   low grade fever in ED  CXR shows bilateral patchy opacities  COVID + outpatient   s/p decadron in ED  c/w dexamethasone 6mg  and  Remdesevir .  will continue with vitamin D, zinc, vitamin C  prn robitussin for cough   f/u COVID markers ESR, CRP, LDH, D dimers, troponin.      Problem/Plan - 3:  ·  Problem: Breast CA.  Plan: patient s/p right mastectomy  was on tamoxifen but recently stopped by her oncologist  f/u with oncologist outpatient      Problem/Plan - 4:  ·  Problem: Need for prophylactic measure.  Plan:   lovenox since pt is covid and has high risk of VTE.     Disposition : DC planning home once off Oxygen .    
Patient, a 55 year old female from home, with PMH of right sided lobular CA in situ breast s/p mastectomy presents to the ED c/o shortness of breath today and fever, cough for a week. COVID known positive. CXR shows infiltrates bilaterally.     Admitted for acute hypoxemic respiratory failure 2/2 COVID.

## 2020-12-22 ENCOUNTER — FORM ENCOUNTER (OUTPATIENT)
Age: 55
End: 2020-12-22

## 2020-12-30 PROBLEM — C50.919 MALIGNANT NEOPLASM OF UNSPECIFIED SITE OF UNSPECIFIED FEMALE BREAST: Chronic | Status: ACTIVE | Noted: 2019-05-22

## 2020-12-30 RX ORDER — TAMOXIFEN CITRATE 20 MG/1
0 TABLET, FILM COATED ORAL
Qty: 0 | Refills: 0 | DISCHARGE

## 2021-06-19 PROBLEM — Z00.00 ENCOUNTER FOR PREVENTIVE HEALTH EXAMINATION: Status: ACTIVE | Noted: 2021-06-19

## 2022-09-28 ENCOUNTER — EMERGENCY (EMERGENCY)
Facility: HOSPITAL | Age: 57
LOS: 1 days | Discharge: HOME | End: 2022-09-28
Attending: EMERGENCY MEDICINE
Payer: COMMERCIAL

## 2022-09-28 ENCOUNTER — TRANSCRIPTION ENCOUNTER (OUTPATIENT)
Age: 57
End: 2022-09-28

## 2022-09-28 VITALS
TEMPERATURE: 98 F | SYSTOLIC BLOOD PRESSURE: 128 MMHG | DIASTOLIC BLOOD PRESSURE: 68 MMHG | HEART RATE: 69 BPM | RESPIRATION RATE: 18 BRPM | OXYGEN SATURATION: 98 %

## 2022-09-28 VITALS
WEIGHT: 192.9 LBS | HEIGHT: 66.93 IN | TEMPERATURE: 98 F | DIASTOLIC BLOOD PRESSURE: 73 MMHG | RESPIRATION RATE: 16 BRPM | SYSTOLIC BLOOD PRESSURE: 136 MMHG | OXYGEN SATURATION: 98 % | HEART RATE: 72 BPM

## 2022-09-28 DIAGNOSIS — Z90.49 ACQUIRED ABSENCE OF OTHER SPECIFIED PARTS OF DIGESTIVE TRACT: Chronic | ICD-10-CM

## 2022-09-28 DIAGNOSIS — Z90.11 ACQUIRED ABSENCE OF RIGHT BREAST AND NIPPLE: Chronic | ICD-10-CM

## 2022-09-28 DIAGNOSIS — Z98.890 OTHER SPECIFIED POSTPROCEDURAL STATES: Chronic | ICD-10-CM

## 2022-09-28 PROBLEM — C50.919 MALIGNANT NEOPLASM OF UNSPECIFIED SITE OF UNSPECIFIED FEMALE BREAST: Chronic | Status: ACTIVE | Noted: 2020-12-15

## 2022-09-28 LAB
ALBUMIN SERPL ELPH-MCNC: 4 G/DL — SIGNIFICANT CHANGE UP (ref 3.5–5)
ALP SERPL-CCNC: 65 U/L — SIGNIFICANT CHANGE UP (ref 40–120)
ALT FLD-CCNC: 31 U/L DA — SIGNIFICANT CHANGE UP (ref 10–60)
ANION GAP SERPL CALC-SCNC: 7 MMOL/L — SIGNIFICANT CHANGE UP (ref 5–17)
APPEARANCE UR: CLEAR — SIGNIFICANT CHANGE UP
AST SERPL-CCNC: 21 U/L — SIGNIFICANT CHANGE UP (ref 10–40)
BACTERIA # UR AUTO: ABNORMAL /HPF
BILIRUB SERPL-MCNC: 0.4 MG/DL — SIGNIFICANT CHANGE UP (ref 0.2–1.2)
BILIRUB UR-MCNC: NEGATIVE — SIGNIFICANT CHANGE UP
BUN SERPL-MCNC: 21 MG/DL — HIGH (ref 7–18)
CALCIUM SERPL-MCNC: 9.6 MG/DL — SIGNIFICANT CHANGE UP (ref 8.4–10.5)
CHLORIDE SERPL-SCNC: 109 MMOL/L — HIGH (ref 96–108)
CO2 SERPL-SCNC: 23 MMOL/L — SIGNIFICANT CHANGE UP (ref 22–31)
COLOR SPEC: YELLOW — SIGNIFICANT CHANGE UP
COMMENT - URINE: SIGNIFICANT CHANGE UP
CREAT SERPL-MCNC: 0.88 MG/DL — SIGNIFICANT CHANGE UP (ref 0.5–1.3)
DIFF PNL FLD: ABNORMAL
EGFR: 78 ML/MIN/1.73M2 — SIGNIFICANT CHANGE UP
EPI CELLS # UR: ABNORMAL /HPF
GLUCOSE SERPL-MCNC: 115 MG/DL — HIGH (ref 70–99)
GLUCOSE UR QL: NEGATIVE — SIGNIFICANT CHANGE UP
HCT VFR BLD CALC: 43.5 % — SIGNIFICANT CHANGE UP (ref 34.5–45)
HGB BLD-MCNC: 13.9 G/DL — SIGNIFICANT CHANGE UP (ref 11.5–15.5)
KETONES UR-MCNC: NEGATIVE — SIGNIFICANT CHANGE UP
LEUKOCYTE ESTERASE UR-ACNC: NEGATIVE — SIGNIFICANT CHANGE UP
MAGNESIUM SERPL-MCNC: 2.1 MG/DL — SIGNIFICANT CHANGE UP (ref 1.6–2.6)
MCHC RBC-ENTMCNC: 25.8 PG — LOW (ref 27–34)
MCHC RBC-ENTMCNC: 32 GM/DL — SIGNIFICANT CHANGE UP (ref 32–36)
MCV RBC AUTO: 80.7 FL — SIGNIFICANT CHANGE UP (ref 80–100)
NITRITE UR-MCNC: NEGATIVE — SIGNIFICANT CHANGE UP
NRBC # BLD: 0 /100 WBCS — SIGNIFICANT CHANGE UP (ref 0–0)
PH UR: 5 — SIGNIFICANT CHANGE UP (ref 5–8)
PLATELET # BLD AUTO: 301 K/UL — SIGNIFICANT CHANGE UP (ref 150–400)
POTASSIUM SERPL-MCNC: 4.6 MMOL/L — SIGNIFICANT CHANGE UP (ref 3.5–5.3)
POTASSIUM SERPL-SCNC: 4.6 MMOL/L — SIGNIFICANT CHANGE UP (ref 3.5–5.3)
PROT SERPL-MCNC: 8.6 G/DL — HIGH (ref 6–8.3)
PROT UR-MCNC: 15
RBC # BLD: 5.39 M/UL — HIGH (ref 3.8–5.2)
RBC # FLD: 13.4 % — SIGNIFICANT CHANGE UP (ref 10.3–14.5)
RBC CASTS # UR COMP ASSIST: ABNORMAL /HPF (ref 0–2)
SODIUM SERPL-SCNC: 139 MMOL/L — SIGNIFICANT CHANGE UP (ref 135–145)
SP GR SPEC: 1.02 — SIGNIFICANT CHANGE UP (ref 1.01–1.02)
UROBILINOGEN FLD QL: NEGATIVE — SIGNIFICANT CHANGE UP
WBC # BLD: 11.27 K/UL — HIGH (ref 3.8–10.5)
WBC # FLD AUTO: 11.27 K/UL — HIGH (ref 3.8–10.5)
WBC UR QL: SIGNIFICANT CHANGE UP /HPF (ref 0–5)

## 2022-09-28 PROCEDURE — 96375 TX/PRO/DX INJ NEW DRUG ADDON: CPT

## 2022-09-28 PROCEDURE — 36415 COLL VENOUS BLD VENIPUNCTURE: CPT

## 2022-09-28 PROCEDURE — 83735 ASSAY OF MAGNESIUM: CPT

## 2022-09-28 PROCEDURE — 85027 COMPLETE CBC AUTOMATED: CPT

## 2022-09-28 PROCEDURE — 81001 URINALYSIS AUTO W/SCOPE: CPT

## 2022-09-28 PROCEDURE — 99284 EMERGENCY DEPT VISIT MOD MDM: CPT | Mod: 25

## 2022-09-28 PROCEDURE — 80053 COMPREHEN METABOLIC PANEL: CPT

## 2022-09-28 PROCEDURE — 96374 THER/PROPH/DIAG INJ IV PUSH: CPT

## 2022-09-28 PROCEDURE — 99284 EMERGENCY DEPT VISIT MOD MDM: CPT

## 2022-09-28 RX ORDER — LIDOCAINE 4 G/100G
10 CREAM TOPICAL ONCE
Refills: 0 | Status: COMPLETED | OUTPATIENT
Start: 2022-09-28 | End: 2022-09-28

## 2022-09-28 RX ORDER — ONDANSETRON 8 MG/1
4 TABLET, FILM COATED ORAL ONCE
Refills: 0 | Status: COMPLETED | OUTPATIENT
Start: 2022-09-28 | End: 2022-09-28

## 2022-09-28 RX ORDER — FAMOTIDINE 10 MG/ML
20 INJECTION INTRAVENOUS ONCE
Refills: 0 | Status: COMPLETED | OUTPATIENT
Start: 2022-09-28 | End: 2022-09-28

## 2022-09-28 RX ORDER — ONDANSETRON 8 MG/1
1 TABLET, FILM COATED ORAL
Qty: 15 | Refills: 0
Start: 2022-09-28 | End: 2022-10-02

## 2022-09-28 RX ORDER — SODIUM CHLORIDE 9 MG/ML
1000 INJECTION INTRAMUSCULAR; INTRAVENOUS; SUBCUTANEOUS ONCE
Refills: 0 | Status: COMPLETED | OUTPATIENT
Start: 2022-09-28 | End: 2022-09-28

## 2022-09-28 RX ORDER — FAMOTIDINE 10 MG/ML
1 INJECTION INTRAVENOUS
Qty: 7 | Refills: 0
Start: 2022-09-28 | End: 2022-10-04

## 2022-09-28 RX ADMIN — LIDOCAINE 10 MILLILITER(S): 4 CREAM TOPICAL at 16:16

## 2022-09-28 RX ADMIN — Medication 15 MILLILITER(S): at 16:16

## 2022-09-28 RX ADMIN — ONDANSETRON 4 MILLIGRAM(S): 8 TABLET, FILM COATED ORAL at 13:02

## 2022-09-28 RX ADMIN — SODIUM CHLORIDE 1000 MILLILITER(S): 9 INJECTION INTRAMUSCULAR; INTRAVENOUS; SUBCUTANEOUS at 13:02

## 2022-09-28 RX ADMIN — FAMOTIDINE 20 MILLIGRAM(S): 10 INJECTION INTRAVENOUS at 13:02

## 2022-09-28 NOTE — ED ADULT NURSE NOTE - NS ED NURSE DISCH DISPOSITION
Procedure Information: Please note that the numeric value listed in the Medication (1) and associated J-code units and Medication (2) and associated J-code units variables are j-code amounts and do not represent either the concentration or the total amount of the medications injected.  I strongly recommend selecting no to the Render J-code information in note question. This will allow your note to be more clear. If you are billing j-codes with your injection codes you need to document the total amount of the medication injected. This amount should match the j-code units. For example, if you are injecting Triamcinolone 40mg as an intramuscular injection you would select 40 for the dose field and mg for the units. This would allow you to document  with 4 units (40mg = 10mg x 4). The total volume is not used to calculate j-codes only the amount of the medication administered. Discharged

## 2022-09-28 NOTE — ED PROVIDER NOTE - OBJECTIVE STATEMENT
Patient is  57-year-old female with a PMH of HTN and breast CA in remission since 2018 presenting with multiple episodes of liquid, non-bloody diarrhea since 0500 this morning. Diarrhea is associated with nausea, no vomiting, and cold sweats. Patient states she thinks she has food poisoning from eating chicken quesadilla last night. She claims a similar incidence occurred 2 months ago also after eating chicken, and this episode resolved after a day and a half. Patient denies fevers, dizziness, light-headedness, headache, chest pain, or shortness of breath. Patient denies any recent travels or sick contacts.

## 2022-09-28 NOTE — ED PROVIDER NOTE - PROGRESS NOTE DETAILS
Patient reports she is feeling much better and requesting discharge.  Patient nontoxic and medically stable for discharge. Results discussed with patient. Return precautions provided and patient understands to return to the ED for concerning or worsening signs and symptoms. Instructed to follow up with primary care physician and agreeable. Patient's questions answered.

## 2022-09-28 NOTE — ED PROVIDER NOTE - NS ED ATTENDING STATEMENT MOD
This was a shared visit with the SHY. I reviewed and verified the documentation and independently performed the documented:

## 2022-09-28 NOTE — ED PROVIDER NOTE - CLINICAL SUMMARY MEDICAL DECISION MAKING FREE TEXT BOX
Patient is a 57-year-old female with a PMH of HTN and breast CA in remission presenting with non-bloody, liquid diarrhea and nausea since 0500 concerning for gastroenteritis. Low suspicion fo C. diff colitis due to no recent history of antibiotic use. Low suspicion for appendicitis or diverticulitis due to absence of abdominal pain.     Will obtains lab work, administer 1L NS IV bolus, and treat patient for nausea with Zofran 4mg IVP and Famotidine 20mg IVP.

## 2022-09-28 NOTE — ED PROVIDER NOTE - ENMT, MLM
Airway patent, Nasal mucosa clear. Mouth with normal mucosa. Throat has no vesicles, no oropharyngeal exudates and uvula is midline.  tongue mildly dehydrated

## 2022-09-28 NOTE — ED PROVIDER NOTE - PATIENT PORTAL LINK FT
You can access the FollowMyHealth Patient Portal offered by [facility] by registering at the following website: [email or web address]. By joining [employer]’s FollowMyHealth portal, you will also be able to view your health information using other applications (apps) compatible with our system.

## 2022-10-13 NOTE — DISCHARGE NOTE PROVIDER - NSDCHHBASESERVICE_GEN_ALL_CORE
Operative Note :   Yves Berg MD    Bruna Riverafrancis  1957    Procedure Date: 10/13/22    Pre-op Diagnosis:  · Personal history of colon polyp    Post-op Diagnosis:  · Diverticulosis    Procedure:   · Colonoscopy to cecum    Surgeon: Yves Berg MD      Anesthesia: MAC    Indications:  · 65-year-old female presenting for colonoscopy.  She describes a colonoscopy done 2017 with finding of adenomatous polyp.    Findings:   · Mild diverticulosis    Recommendations:   · Repeat colonoscopy in 5 years based on prior history of colon polyps at last scope.    Description of procedure:    After obtaining informed consent, patient was brought to the endoscopy suite and was sedated.  Digital rectal exam was undertaken and was unremarkable.  The flexible colonoscope was then introduced through the rectum and passed around to the level of the cecum under direct visualization with minimal difficulty.  The scope was then slowly withdrawn, carefully visualizing all mucosal surfaces.  The ileocecal valve and appendiceal orifice were normal.  The remainder of the cecum, ascending colon, hepatic flexure, transverse colon, splenic flexure, descending colon were normal.  In the sigmoid colon there were moderate diverticular changes.  The scope was pulled into the rectum which was unremarkable.    Yves Berg MD  General and Endoscopic Surgery  [facility]    [address], [address]  [place], [place], [address]  P: [phone]  F: [phone]                   
Nursing

## 2023-03-16 ENCOUNTER — EMERGENCY (EMERGENCY)
Facility: HOSPITAL | Age: 58
LOS: 1 days | Discharge: HOME | End: 2023-03-16
Attending: EMERGENCY MEDICINE
Payer: COMMERCIAL

## 2023-03-16 VITALS
DIASTOLIC BLOOD PRESSURE: 84 MMHG | SYSTOLIC BLOOD PRESSURE: 132 MMHG | WEIGHT: 190.04 LBS | OXYGEN SATURATION: 96 % | HEART RATE: 96 BPM | RESPIRATION RATE: 19 BRPM | HEIGHT: 66 IN | TEMPERATURE: 100 F

## 2023-03-16 DIAGNOSIS — Z90.49 ACQUIRED ABSENCE OF OTHER SPECIFIED PARTS OF DIGESTIVE TRACT: Chronic | ICD-10-CM

## 2023-03-16 DIAGNOSIS — Z98.890 OTHER SPECIFIED POSTPROCEDURAL STATES: Chronic | ICD-10-CM

## 2023-03-16 DIAGNOSIS — Z90.11 ACQUIRED ABSENCE OF RIGHT BREAST AND NIPPLE: Chronic | ICD-10-CM

## 2023-03-16 PROCEDURE — 99283 EMERGENCY DEPT VISIT LOW MDM: CPT

## 2023-03-16 PROCEDURE — 99284 EMERGENCY DEPT VISIT MOD MDM: CPT

## 2023-03-16 PROCEDURE — 0225U NFCT DS DNA&RNA 21 SARSCOV2: CPT

## 2023-03-16 RX ORDER — IBUPROFEN 200 MG
1 TABLET ORAL
Qty: 21 | Refills: 0
Start: 2023-03-16 | End: 2023-03-22

## 2023-03-16 RX ORDER — ACETAMINOPHEN 500 MG
650 TABLET ORAL ONCE
Refills: 0 | Status: COMPLETED | OUTPATIENT
Start: 2023-03-16 | End: 2023-03-16

## 2023-03-16 RX ORDER — DEXAMETHASONE 0.5 MG/5ML
12 ELIXIR ORAL ONCE
Refills: 0 | Status: COMPLETED | OUTPATIENT
Start: 2023-03-16 | End: 2023-03-16

## 2023-03-16 RX ADMIN — Medication 650 MILLIGRAM(S): at 22:31

## 2023-03-16 RX ADMIN — Medication 1 TABLET(S): at 22:30

## 2023-03-16 RX ADMIN — Medication 12 MILLIGRAM(S): at 22:31

## 2023-03-16 NOTE — ED ADULT NURSE NOTE - OBJECTIVE STATEMENT
pt from home c/o of sore throat, fever and "feel trouble breathing", pt appears comfortable sitting on stretcher, denies any trouble breathing, speaks clear full sentences no drooling

## 2023-03-16 NOTE — ED ADULT NURSE NOTE - NSICDXPASTSURGICALHX_GEN_ALL_CORE_FT
PAST SURGICAL HISTORY:  H/O lumpectomy     H/O right mastectomy     History of colon resection     S/P appendectomy     S/P cholecystectomy

## 2023-03-16 NOTE — ED PROVIDER NOTE - OBJECTIVE STATEMENT
58 year old female PMH breast cancer in remission, HTN coming in with 1 days of sore throat, fever, congestion. taking motrin for fever. states also with some body aches. denies all other complaints.

## 2023-03-16 NOTE — ED PROVIDER NOTE - PATIENT PORTAL LINK FT
You can access the FollowMyHealth Patient Portal offered by [facility] by registering at the following website: [email or web address]. By joining [employer]’s FollowMyHealth portal, you will also be able to view your health information using other applications (apps) compatible with our system.

## 2023-03-16 NOTE — ED ADULT TRIAGE NOTE - HAVE YOU RECEIVED AT LEAST TWO PFIZER AND/OR MODERNA VACCINATIONS (IN ANY COMBINATION) AND/OR ONE JOHNSON & JOHNSON VACCINATION?
Problem: Patient Care Overview (Adult)  Goal: Plan of Care Review  Outcome: Ongoing (interventions implemented as appropriate)    17 1529   Coping/Psychosocial Response Interventions   Plan Of Care Reviewed With patient   Patient Care Overview   Progress progress toward functional goals as expected       Goal: Adult Individualization and Mutuality  Outcome: Ongoing (interventions implemented as appropriate)  Goal: Discharge Needs Assessment  Outcome: Ongoing (interventions implemented as appropriate)    17 1529   Discharge Needs Assessment   Concerns To Be Addressed no discharge needs identified         Problem: Postpartum ( Delivery) (Adult)  Goal: Signs and Symptoms of Listed Potential Problems Will be Absent or Manageable (Postpartum)  Outcome: Ongoing (interventions implemented as appropriate)  PO meds controlling pain. Bleeding improved to light, no clots noted to this point.     17 1529   Postpartum ( Delivery)   Problems Assessed (Postpartum ) all   Problems Present (Postpartum ) none            Yes

## 2023-03-16 NOTE — ED PROVIDER NOTE - NSFOLLOWUPINSTRUCTIONS_ED_ALL_ED_FT
[facility]                                                                                                                               Pharyngitis    Upper body outline including the pharynx.   Pharyngitis is inflammation of the throat (pharynx). It is a very common cause of sore throat. Pharyngitis can be caused by a bacteria, but it is usually caused by a virus. Most cases of pharyngitis get better on their own without treatment.      What are the causes?    This condition may be caused by:  •Infection by viruses (viral). Viral pharyngitis spreads easily from person to person (is contagious) through coughing, sneezing, and sharing of personal items or utensils such as cups, forks, spoons, and toothbrushes.      •Infection by bacteria (bacterial). Bacterial pharyngitis may be spread by touching the nose or face after coming in contact with the bacteria, or through close contact, such as kissing.      •Allergies. Allergies can cause buildup of mucus in the throat (post-nasal drip), leading to inflammation and irritation. Allergies can also cause blocked nasal passages, forcing breathing through the mouth, which dries and irritates the throat.        What increases the risk?    You are more likely to develop this condition if:  •You are 5–24 years old.      •You are exposed to crowded environments such as , school, or dormitory living.      •You live in a cold climate.      •You have a weakened disease-fighting (immune) system.        What are the signs or symptoms?    Symptoms of this condition vary by the cause. Common symptoms of this condition include:  •Sore throat.      •Fatigue.      •Low-grade fever.      •Stuffy nose (nasal congestion) and cough.      •Headache.      Other symptoms may include:  •Glands in the neck (lymph nodes) that are swollen.      •Skin rashes.      •Plaque-like film on the throat or tonsils. This is often a symptom of bacterial pharyngitis.      •Vomiting.      •Red, itchy eyes (conjunctivitis).      •Loss of appetite.      •Joint pain and muscle aches.      •Enlarged tonsils.        How is this diagnosed?    This condition may be diagnosed based on your medical history and a physical exam. Your health care provider will ask you questions about your illness and your symptoms.    A swab of your throat may be done to check for bacteria (rapid strep test). Other lab tests may also be done, depending on the suspected cause, but these are rare.      How is this treated?    Many times, treatment is not needed for this condition. Pharyngitis usually gets better in 3–4 days without treatment.    Bacterial pharyngitis may be treated with antibiotic medicines.      Follow these instructions at home:    Medicines     •Take over-the-counter and prescription medicines only as told by your health care provider.      •If you were prescribed an antibiotic medicine, take it as told by your health care provider. Do not stop taking the antibiotic even if you start to feel better.      •Use throat sprays to soothe your throat as told by your health care provider.      •Children can get pharyngitis. Do not give your child aspirin because of the association with Reye's syndrome.        Managing pain   A cup of hot tea.   To help with pain, try:  •Sipping warm liquids, such as broth, herbal tea, or warm water.      •Eating or drinking cold or frozen liquids, such as frozen ice pops.      •Gargling with a mixture of salt and water 3–4 times a day or as needed. To make salt water, completely dissolve ½–1 tsp (3–6 g) of salt in 1 cup (237 mL) of warm water.      •Sucking on hard candy or throat lozenges.      •Putting a cool-mist humidifier in your bedroom at night to moisten the air.      •Sitting in the bathroom with the door closed for 5–10 minutes while you run hot water in the shower.        General instructions   A do not smoke cigarettes sign.   • Do not use any products that contain nicotine or tobacco. These products include cigarettes, chewing tobacco, and vaping devices, such as e-cigarettes. If you need help quitting, ask your health care provider.      •Rest as told by your health care provider.      •Drink enough fluid to keep your urine pale yellow.        How is this prevented?    To help prevent becoming infected or spreading infection:  •Wash your hands often with soap and water for at least 20 seconds. If soap and water are not available, use hand .      •Do not touch your eyes, nose, or mouth with unwashed hands, and wash hands after touching these areas.      •Do not share cups or eating utensils.      •Avoid close contact with people who are sick.        Contact a health care provider if:    •You have large, tender lumps in your neck.      •You have a rash.      •You cough up green, yellow-brown, or bloody mucus.        Get help right away if:    •Your neck becomes stiff.      •You drool or are unable to swallow liquids.      •You cannot drink or take medicines without vomiting.      •You have severe pain that does not go away, even after you take medicine.      •You have trouble breathing, and it is not caused by a stuffy nose.      •You have new pain and swelling in your joints such as the knees, ankles, wrists, or elbows.      These symptoms may represent a serious problem that is an emergency. Do not wait to see if the symptoms will go away. Get medical help right away. Call your local emergency services (911 in the [place] Do not drive yourself to the hospital.       Summary    •Pharyngitis is redness, pain, and swelling (inflammation) of the throat (pharynx).      •While pharyngitis can be caused by a bacteria, the most common causes are viral.      •Most cases of pharyngitis get better on their own without treatment.      •Bacterial pharyngitis is treated with antibiotic medicines.      This information is not intended to replace advice given to you by your health care provider. Make sure you discuss any questions you have with your health care provider.      Document Revised: 03/16/2022 Document Reviewed: 03/16/2022    [employer] Patient Education © 2023 [employer]

## 2023-03-17 LAB
B PERT DNA SPEC QL NAA+PROBE: SIGNIFICANT CHANGE UP
C PNEUM DNA SPEC QL NAA+PROBE: SIGNIFICANT CHANGE UP
FLUAV H1 2009 PAND RNA SPEC QL NAA+PROBE: SIGNIFICANT CHANGE UP
FLUAV H1 RNA SPEC QL NAA+PROBE: SIGNIFICANT CHANGE UP
FLUAV H3 RNA SPEC QL NAA+PROBE: SIGNIFICANT CHANGE UP
FLUAV SUBTYP SPEC NAA+PROBE: SIGNIFICANT CHANGE UP
FLUBV RNA SPEC QL NAA+PROBE: SIGNIFICANT CHANGE UP
HADV DNA SPEC QL NAA+PROBE: DETECTED
HCOV PNL SPEC NAA+PROBE: SIGNIFICANT CHANGE UP
HMPV RNA SPEC QL NAA+PROBE: SIGNIFICANT CHANGE UP
HPIV1 RNA SPEC QL NAA+PROBE: SIGNIFICANT CHANGE UP
HPIV2 RNA SPEC QL NAA+PROBE: SIGNIFICANT CHANGE UP
HPIV3 RNA SPEC QL NAA+PROBE: SIGNIFICANT CHANGE UP
HPIV4 RNA SPEC QL NAA+PROBE: SIGNIFICANT CHANGE UP
RAPID RVP RESULT: DETECTED
RV+EV RNA SPEC QL NAA+PROBE: SIGNIFICANT CHANGE UP
SARS-COV-2 RNA SPEC QL NAA+PROBE: SIGNIFICANT CHANGE UP

## 2023-03-24 ENCOUNTER — APPOINTMENT (OUTPATIENT)
Dept: ENDOCRINOLOGY | Facility: CLINIC | Age: 58
End: 2023-03-24

## 2023-04-10 NOTE — ED ADULT NURSE NOTE - NSFALLRSKHARMRISK_ED_ALL_ED
Impression: Vitreous degeneration, bilateral: H43.813. Plan: All signs and risks of retinal detachment were discussed in detail. Patient instructed to call the office immediately if any signs or symptoms of retinal detachment are noticed. no

## 2023-08-16 ENCOUNTER — EMERGENCY (EMERGENCY)
Facility: HOSPITAL | Age: 58
LOS: 1 days | Discharge: HOME | End: 2023-08-16
Attending: EMERGENCY MEDICINE
Payer: COMMERCIAL

## 2023-08-16 VITALS
HEART RATE: 76 BPM | TEMPERATURE: 98 F | SYSTOLIC BLOOD PRESSURE: 157 MMHG | OXYGEN SATURATION: 97 % | RESPIRATION RATE: 18 BRPM | DIASTOLIC BLOOD PRESSURE: 90 MMHG | WEIGHT: 190.04 LBS

## 2023-08-16 DIAGNOSIS — Z90.49 ACQUIRED ABSENCE OF OTHER SPECIFIED PARTS OF DIGESTIVE TRACT: Chronic | ICD-10-CM

## 2023-08-16 DIAGNOSIS — Z90.11 ACQUIRED ABSENCE OF RIGHT BREAST AND NIPPLE: Chronic | ICD-10-CM

## 2023-08-16 DIAGNOSIS — Z98.890 OTHER SPECIFIED POSTPROCEDURAL STATES: Chronic | ICD-10-CM

## 2023-08-16 PROCEDURE — 73562 X-RAY EXAM OF KNEE 3: CPT

## 2023-08-16 PROCEDURE — 73562 X-RAY EXAM OF KNEE 3: CPT | Mod: 26,RT

## 2023-08-16 PROCEDURE — 99284 EMERGENCY DEPT VISIT MOD MDM: CPT

## 2023-08-16 PROCEDURE — 99283 EMERGENCY DEPT VISIT LOW MDM: CPT | Mod: 25

## 2023-08-16 NOTE — ED PROVIDER NOTE - NSFOLLOWUPINSTRUCTIONS_ED_ALL_ED_FT
will refer to ortho  naprosyn for pain            Log Out.  [employer] CareNotes®  :  [facility]        KNEE PAIN - General Information    Knee Pain    WHAT YOU NEED TO KNOW:    What do I need to know about knee pain? Knee pain may start suddenly, or it may be a long-term problem. You may have pain on the side, front, or back of your knee. You may have knee stiffness and swelling. You may hear popping sounds or feel like your knee is giving way or locking up as you walk. You may feel pain when you sit, stand, walk, or climb up and down stairs.    What increases my risk for knee pain?    Obesity    A strain or tear in ligaments or tendons    A leg fracture or knee dislocation    Overuse of your knee    Osteoarthritis, rheumatoid arthritis, or gout    An infection, tumor, or cyst in your knee    Shoes that are not supportive, or training on a hard surface    Sports that involve jumping or pivoting on your knee  How is the cause of knee pain diagnosed? Your healthcare provider will examine your knee and ask about your symptoms. Tell your provider when the pain started and what you were doing at the time. Describe the pain, such as sharp, throbbing, or achy. Tell your provider about any knee injury or surgery you had. You may need any of the following:    MRI, CT, or ultrasound pictures may show an injury, fracture, or tumor.    Blood tests may be used to check the level of inflammation in your blood. The tests may also show signs of infection.    Arthroscopy is a procedure to look inside your knee joint with an arthroscope. An arthroscope is a flexible tube with a light and camera on the end. A knee arthroscopy is usually done to check for disease or damage inside your knee. These problems may be fixed during the procedure.  How is knee pain treated? Treatment will depend on the cause of your pain. You may need any of the following:    NSAIDs help decrease swelling and pain or fever. This medicine is available with or without a doctor's order. NSAIDs can cause stomach bleeding or kidney problems in certain people. If you take blood thinner medicine, always ask your healthcare provider if NSAIDs are safe for you. Always read the medicine label and follow directions.    Acetaminophen decreases pain and fever. It is available without a doctor's order. Ask how much to take and how often to take it. Follow directions. Read the labels of all other medicines you are using to see if they also contain acetaminophen, or ask your doctor or pharmacist. Acetaminophen can cause liver damage if not taken correctly.    Prescription pain medicine may be given. Ask your healthcare provider how to take this medicine safely. Some prescription pain medicines contain acetaminophen. Do not take other medicines that contain acetaminophen without talking to your healthcare provider. Too much acetaminophen may cause liver damage. Prescription pain medicine may cause constipation. Ask your healthcare provider how to prevent or treat constipation.    Steroid injections may be given into your knee. Steroids reduce inflammation and pain.    Surgery may be used for some injuries, such as to repair a torn ACL.  What can I do to manage my symptoms?    Rest your knee so it can heal. Limit activities that increase your pain. Do low-impact exercises, such as walking or swimming.    Apply ice to help reduce swelling and pain. Use an ice pack, or put crushed ice in a plastic bag. Cover it with a towel before you apply it to your knee. Apply ice for 15 to 20 minutes every hour, or as directed.    Apply compression to help reduce swelling. Use a brace or bandage only as directed.    Elevate your knee to help decrease pain and swelling. Elevate your knee while you are sitting or lying down. Prop your leg on pillows to keep your knee above the level of your heart.    Prevent your knee from moving as directed. Your healthcare provider may put on a cast or splint. You may need to wear a leg brace to stabilize your knee. A leg brace can be adjusted to increase your range of motion as your knee heals.  Hinged Knee Braces   What can I do to prevent knee pain?    Maintain a healthy weight. Extra weight increases your risk for knee pain. Ask your healthcare provider how much you should weigh. He or she can help you create a safe weight loss plan if you need to lose weight.    Exercise or train properly. Use the correct equipment for sports. Wear shoes that provide good support. Check your posture often as you exercise, play sports, or train for an event. This can help prevent stress and strain on your knees. Rest between sessions so you do not overwork your knees.  When should I seek immediate care?    Your pain is worse, even after treatment.    You cannot bend or straighten your leg completely.    The swelling around your knee does not go down even with treatment.    Your knee is painful and hot to the touch.  When should I contact my healthcare provider?    You have questions or concerns about your condition or care.    CARE AGREEMENT:    You have the right to help plan your care. Learn about your health condition and how it may be treated. Discuss treatment options with your healthcare providers to decide what care you want to receive. You always have the right to refuse treatment.    © [employer] 1973, 2023    	  back to top            © [employer] 1973, 2023

## 2023-08-16 NOTE — ED ADULT NURSE NOTE - NSFALLUNIVINTERV_ED_ALL_ED
Bed/Stretcher in lowest position, wheels locked, appropriate side rails in place/Call bell, personal items and telephone in reach/Instruct patient to call for assistance before getting out of bed/chair/stretcher/Non-slip footwear applied when patient is off stretcher/[place] to call system/Physically safe environment - no spills, clutter or unnecessary equipment/Purposeful proactive rounding/Room/bathroom lighting operational, light cord in reach

## 2023-08-16 NOTE — ED ADULT NURSE NOTE - NS ED NOTE ABUSE RESPONSE YN
Consent: -Patient tolerated well, no complications\\n-Post-care instructions discussed and handout given\\n\\nWritten consent obtained. Risks include but not limited to lid/brow ptosis, bruising, swelling, diplopia, temporary effect, incomplete chemical denervation. Yes

## 2023-08-16 NOTE — ED PROVIDER NOTE - OBJECTIVE STATEMENT
Patient is a 58-year-old female complaining of right knee pain for 3 weeks has been taking Motrin without relief of no history of arthritis no fever no chills able to bear weight but complaining of pain without relief.History of mastectomy on tamoxifen and also a history of prediabetes

## 2023-08-16 NOTE — ED PROVIDER NOTE - NSFOLLOWUPCLINICS_GEN_ALL_ED_FT
[facility]  Orthopedics  [address] [place] [address]  Phone: [phone]  Fax: [phone]

## 2023-08-16 NOTE — ED PROVIDER NOTE - PATIENT PORTAL LINK FT
You can access the FollowMyHealth Patient Portal offered by [facility] by registering at the following website: [email or web address]. By joining [facility]’s FollowMyHealth portal, you will also be able to view your health information using other applications (apps) compatible with our system.

## 2023-08-17 ENCOUNTER — APPOINTMENT (OUTPATIENT)
Dept: ORTHOPEDIC SURGERY | Facility: CLINIC | Age: 58
End: 2023-08-17
Payer: COMMERCIAL

## 2023-08-17 ENCOUNTER — NON-APPOINTMENT (OUTPATIENT)
Age: 58
End: 2023-08-17

## 2023-08-17 VITALS — HEIGHT: 66 IN | WEIGHT: 190 LBS | BODY MASS INDEX: 30.53 KG/M2

## 2023-08-17 DIAGNOSIS — M25.569 PAIN IN UNSPECIFIED KNEE: ICD-10-CM

## 2023-08-17 PROCEDURE — 73560 X-RAY EXAM OF KNEE 1 OR 2: CPT | Mod: RT

## 2023-08-17 PROCEDURE — 99203 OFFICE O/P NEW LOW 30 MIN: CPT | Mod: 25

## 2023-08-17 NOTE — DISCUSSION/SUMMARY
[de-identified] : 58-year-old female with subacute right knee pain and swelling due to moderate degenerative arthritis and synovitis. We discussed initial nonoperative management including weight reduction, activity modification with low impact exercise, PRN use of acetaminophen or anti-inflammatory medication if tolerated, glucosamine/chondroitin supplements, and physical therapy. Further treatments can include corticosteroid injection and the use of hyaluronic acid injections. She would like to try physical therapy and referral was given.  Recommended follow-up in 2 months for consideration of cortisone injection.

## 2023-08-17 NOTE — PHYSICAL EXAM
[de-identified] : General: No acute distress Mental: Alert and oriented x3 Eyes: Conjunctivitis not seen Chest: Symmetric chest rise, no audible wheezing Skin: Bilateral lower extremities absent from rashes and ulcers Abdomen: No distention  Right knee: Skin: Clean, dry, intact  Inspection: No obvious malalignment, no masses, no swelling, small effusion. posterior swelling. Tenderness: positive MJLT. no LJLT. No tenderness over the medial and lateral patella facets. No ttp medial/lateral epicondyle, patella tendon, tibial tubercle, pes anserinus, or proximal fibula.  ROM: 0 to 130 mild crepitus Stability: Stable to varus and valgus, negative lachman. Negative anterior/posterior drawer.  Additional tests: Negative McMurrays test, negative Steinmann, Negative patellar grind test.   Strength: 5/5 Q/H/TA/GS/EHL, no atrophy  Neuro: Sensation intact to light touch throughout in dp/sp/tib/rose/saph nerve distributions Pulses: 2+ DP/PT pulses. [de-identified] : X-rays of the right knee from the ED available in care stream on 8/16.  Mild narrowing of the medial lateral joint space with subchondral sclerosis and small marginal osteophytes. [place] view right knee today shows patella in a central position.  Kellgren-Volodymyr 2

## 2023-08-17 NOTE — HISTORY OF PRESENT ILLNESS
[de-identified] : 58-year-old female presents with 3 months of right knee pain and swelling.  She believes this began when she was exercising doing kettle bells.  Pain is intermittent and ranges from moderate to severe, mainly along the medial aspect, associated swelling.  There is occasional radiation proximally along the thigh muscles and distally towards the leg.  She denies buckling, locking, clicking, or catching.  She has been treating this with ice, Tylenol, and stretching exercises.  She has a sister who is a physical therapist in [facility].

## 2024-02-05 ENCOUNTER — TRANSCRIPTION ENCOUNTER (OUTPATIENT)
Age: 59
End: 2024-02-05

## 2024-02-22 PROBLEM — R73.03 PREDIABETES: Chronic | Status: ACTIVE | Noted: 2023-08-16

## 2024-02-28 ENCOUNTER — APPOINTMENT (OUTPATIENT)
Dept: MRI IMAGING | Facility: CLINIC | Age: 59
End: 2024-02-28
Payer: COMMERCIAL

## 2024-02-28 PROCEDURE — 73721 MRI JNT OF LWR EXTRE W/O DYE: CPT | Mod: RT

## 2024-03-14 ENCOUNTER — APPOINTMENT (OUTPATIENT)
Dept: ORTHOPEDIC SURGERY | Facility: CLINIC | Age: 59
End: 2024-03-14
Payer: COMMERCIAL

## 2024-03-14 DIAGNOSIS — M17.11 UNILATERAL PRIMARY OSTEOARTHRITIS, RIGHT KNEE: ICD-10-CM

## 2024-03-14 DIAGNOSIS — S83.241D OTHER TEAR OF MEDIAL MENISCUS, CURRENT INJURY, RIGHT KNEE, SUBSEQUENT ENCOUNTER: ICD-10-CM

## 2024-03-14 PROCEDURE — 99214 OFFICE O/P EST MOD 30 MIN: CPT

## 2024-03-14 NOTE — PHYSICAL EXAM
[de-identified] : General: No acute distress Mental: Alert and oriented x3 Eyes: Conjunctivitis not seen Chest: Symmetric chest rise, no audible wheezing Skin: Bilateral lower extremities absent from rashes and ulcers Abdomen: No distention  Right knee: Skin: Clean, dry, intact  Inspection: No obvious malalignment, no masses, no swelling, small effusion.  Tenderness: positive MJLT. no LJLT. No tenderness over the medial and lateral patella facets. No ttp medial/lateral epicondyle, patella tendon, tibial tubercle, pes anserinus, or proximal fibula.  ROM: 0 to 130 mild crepitus Stability: Stable to varus and valgus, negative lachman. Negative anterior/posterior drawer.  Additional tests: Negative McMurrays test, negative Steinmann, Negative patellar grind test.   Strength: 5/5 Q/H/TA/GS/EHL, no atrophy  Neuro: Sensation intact to light touch throughout in dp/sp/tib/rose/saph nerve distributions Pulses: 2+ DP/PT pulses. [de-identified] : MRI right knee shows moderate OA and medial meniscus tear.

## 2024-03-14 NOTE — DISCUSSION/SUMMARY
[de-identified] : 59-year-old female with chronic right knee pain secondary to moderate degenerative arthritis and degenerative medial meniscus tear.  She is not having mechanical symptoms.  We discussed management including additional physical therapy, ice or bracing, cortisone injection, viscosupplementation, knee arthroscopy surgery.  She declined cortisone injection today.  She would like to avoid surgery.  And referral was given for physical therapy. followup 2 months

## 2024-03-14 NOTE — HISTORY OF PRESENT ILLNESS
[de-identified] : Right knee intermittent pain, worse with activity and walking. She has stiffness, pain medially, and no mechanical symptoms.  She works with [employer] and travels extensively during the year.  She feels the pain is worse when wearing heels. She has done PT and still has pain. Recently had MRI.

## 2024-05-14 ENCOUNTER — APPOINTMENT (OUTPATIENT)
Dept: ORTHOPEDIC SURGERY | Facility: CLINIC | Age: 59
End: 2024-05-14

## 2024-11-20 ENCOUNTER — EMERGENCY (EMERGENCY)
Facility: HOSPITAL | Age: 59
LOS: 1 days | Discharge: HOME | End: 2024-11-20
Attending: EMERGENCY MEDICINE
Payer: COMMERCIAL

## 2024-11-20 VITALS
TEMPERATURE: 98 F | RESPIRATION RATE: 16 BRPM | HEART RATE: 68 BPM | SYSTOLIC BLOOD PRESSURE: 134 MMHG | WEIGHT: 180.78 LBS | OXYGEN SATURATION: 99 % | HEIGHT: 66 IN | DIASTOLIC BLOOD PRESSURE: 77 MMHG

## 2024-11-20 DIAGNOSIS — Z98.890 OTHER SPECIFIED POSTPROCEDURAL STATES: Chronic | ICD-10-CM

## 2024-11-20 DIAGNOSIS — Z90.49 ACQUIRED ABSENCE OF OTHER SPECIFIED PARTS OF DIGESTIVE TRACT: Chronic | ICD-10-CM

## 2024-11-20 DIAGNOSIS — Z90.11 ACQUIRED ABSENCE OF RIGHT BREAST AND NIPPLE: Chronic | ICD-10-CM

## 2024-11-20 LAB
FLUAV AG NPH QL: SIGNIFICANT CHANGE UP
FLUBV AG NPH QL: SIGNIFICANT CHANGE UP
RSV RNA NPH QL NAA+NON-PROBE: SIGNIFICANT CHANGE UP
SARS-COV-2 RNA SPEC QL NAA+PROBE: SIGNIFICANT CHANGE UP

## 2024-11-20 PROCEDURE — 87637 SARSCOV2&INF A&B&RSV AMP PRB: CPT

## 2024-11-20 PROCEDURE — 99284 EMERGENCY DEPT VISIT MOD MDM: CPT

## 2024-11-20 PROCEDURE — 99283 EMERGENCY DEPT VISIT LOW MDM: CPT

## 2024-11-20 RX ORDER — DEXAMETHASONE 1.5 MG 1.5 MG/1
6 TABLET ORAL ONCE
Refills: 0 | Status: COMPLETED | OUTPATIENT
Start: 2024-11-20 | End: 2024-11-20

## 2024-11-20 RX ADMIN — Medication 100 MILLIGRAM(S): at 13:29

## 2024-11-20 RX ADMIN — DEXAMETHASONE 1.5 MG 6 MILLIGRAM(S): 1.5 TABLET ORAL at 13:28

## 2024-11-20 NOTE — ED PROVIDER NOTE - CLINICAL SUMMARY MEDICAL DECISION MAKING FREE TEXT BOX
Patient with cough sore throat nasal congestion.  Well-appearing.  Will do Decadron for sore throat patient advised to take ibuprofen.  Shared decision making regarding continuing Sudafed given her history of hypertension.

## 2024-11-20 NOTE — ED PROVIDER NOTE - PHYSICAL EXAMINATION
Heart regular lungs clear abdomen soft nontender speaking full sentences throat mildly red.  No exudate uvula is midline.  TM clear bilaterally.  Speaking full sentences awake alert

## 2024-11-20 NOTE — ED PROVIDER NOTE - OBJECTIVE STATEMENT
59-year-old female hypertension prediabetes presents with 5 days of sore throat cough congestion.  Patient took medication from [facility] which had paracetamol and  pseudoephedrine which had some mild temporary relief.  Patient is concerned because now she developed more cough and a sore throat is uncomfortable.

## 2024-11-20 NOTE — ED PROVIDER NOTE - NSFOLLOWUPINSTRUCTIONS_ED_ALL_ED_FT
Upper Respiratory Infection, Adult  An upper respiratory infection (URI) is a common viral infection of the nose, throat, and upper air passages that lead to the lungs. The most common type of URI is the common cold. URIs usually get better on their own, without medical treatment.    What are the causes?  A URI is caused by a virus. You may catch a virus by:  Breathing in droplets from an infected person's cough or sneeze.  Touching something that has been exposed to the virus (is contaminated) and then touching your mouth, nose, or eyes.  What increases the risk?  You are more likely to get a URI if:  You are very young or very old.  You have close contact with others, such as at work, school, or a health care facility.  You smoke.  You have long-term (chronic) heart or lung disease.  You have a weakened disease-fighting system (immune system).  You have nasal allergies or asthma.  You are experiencing a lot of stress.  You have poor nutrition.  What are the signs or symptoms?  A URI usually involves some of the following symptoms:  Runny or stuffy (congested) nose.  Cough.  Sneezing.  Sore throat.  Headache.  Fatigue.  Fever.  Loss of appetite.  Pain in your forehead, behind your eyes, and over your cheekbones (sinus pain).  Muscle aches.  Redness or irritation of the eyes.  Pressure in the ears or face.  How is this diagnosed?  This condition may be diagnosed based on your medical history and symptoms, and a physical exam. Your health care provider may use a swab to take a mucus sample from your nose (nasal swab). This sample can be tested to determine what virus is causing the illness.    How is this treated?  URIs usually get better on their own within 7–10 days. Medicines cannot cure URIs, but your health care provider may recommend certain medicines to help relieve symptoms, such as:  Over-the-counter cold medicines.  Cough suppressants. Coughing is a type of defense against infection that helps to clear the respiratory system, so take these medicines only as recommended by your health care provider.  Fever-reducing medicines.  Follow these instructions at home:  Activity    Rest as needed.  If you have a fever, stay home from work or school until your fever is gone or until your health care provider says your URI cannot spread to other people (is no longer contagious). Your health care provider may have you wear a face mask to prevent your infection from spreading.  Relieving symptoms    Gargle with a mixture of salt and water 3–4 times a day or as needed. To make salt water, completely dissolve ½–1 tsp (3–6 g) of salt in 1 cup (237 mL) of warm water.  Use a cool-mist humidifier to add moisture to the air. This can help you breathe more easily.  Eating and drinking    A comparison of three sample cups showing dark yellow, yellow, and pale yellow urine.  Drink enough fluid to keep your urine pale yellow.  Eat soups and other clear broths.  General instructions    A sign showing that a person should not smoke.  Take over-the-counter and prescription medicines only as told by your health care provider. These include cold medicines, fever reducers, and cough suppressants.  Do not use any products that contain nicotine or tobacco. These products include cigarettes, chewing tobacco, and vaping devices, such as e-cigarettes. If you need help quitting, ask your health care provider.  Stay away from secondhand smoke.  Stay up to date on all immunizations, including the yearly (annual) flu vaccine.  Keep all follow-up visits. This is important.  How to prevent the spread of infection to others    Washing hands with soap and water.  URIs can be contagious. To prevent the infection from spreading:  Wash your hands with soap and water for at least 20 seconds. If soap and water are not available, use hand .  Avoid touching your mouth, face, eyes, or nose.  Cough or sneeze into a tissue or your sleeve or elbow instead of into your hand or into the air.  Contact a health care provider if:  You are getting worse instead of better.  You have a fever or chills.  Your mucus is brown or red.  You have yellow or brown discharge coming from your nose.  You have pain in your face, especially when you bend forward.  You have swollen neck glands.  You have pain while swallowing.  You have white areas in the back of your throat.  Get help right away if:  You have shortness of breath that gets worse.  You have severe or persistent:  Headache.  Ear pain.  Sinus pain.  Chest pain.  You have chronic lung disease along with any of the following:  Making high-pitched whistling sounds when you breathe, most often when you breathe out (wheezing).  Prolonged cough (more than 14 days).  Coughing up blood.  A change in your usual mucus.  You have a stiff neck.  You have changes in your:  Vision.  Hearing.  Thinking.  Mood.  These symptoms may be an emergency. Get help right away. Call 911.  Do not wait to see if the symptoms will go away.  Do not drive yourself to the hospital.  Summary  An upper respiratory infection (URI) is a common infection of the nose, throat, and upper air passages that lead to the lungs.  A URI is caused by a virus.  URIs usually get better on their own within 7–10 days.  Medicines cannot cure URIs, but your health care provider may recommend certain medicines to help relieve symptoms.  This information is not intended to replace advice given to you by your health care provider. Make sure you discuss any questions you have with your health care provider.    Document Revised: 07/20/2022 Document Reviewed: 07/20/2022  [employer] Patient Education © 2024 [employer]

## 2024-11-20 NOTE — ED PROVIDER NOTE - PATIENT PORTAL LINK FT
You can access the FollowMyHealth Patient Portal offered by [facility] by registering at the following website: [email or web address]. By joining [facility]’s FollowMyHealth portal, you will also be able to view your health information using other applications (apps) compatible with our system.